# Patient Record
Sex: FEMALE | Race: WHITE | NOT HISPANIC OR LATINO | Employment: OTHER | URBAN - METROPOLITAN AREA
[De-identification: names, ages, dates, MRNs, and addresses within clinical notes are randomized per-mention and may not be internally consistent; named-entity substitution may affect disease eponyms.]

---

## 2017-03-06 ENCOUNTER — AMBULATORY - HEALTHEAST (OUTPATIENT)
Dept: CARDIOLOGY | Facility: CLINIC | Age: 82
End: 2017-03-06

## 2017-03-06 DIAGNOSIS — Z95.0 PACEMAKER: ICD-10-CM

## 2017-06-13 ENCOUNTER — AMBULATORY - HEALTHEAST (OUTPATIENT)
Dept: CARDIOLOGY | Facility: CLINIC | Age: 82
End: 2017-06-13

## 2017-06-13 DIAGNOSIS — Z95.0 CARDIAC PACEMAKER IN SITU: ICD-10-CM

## 2017-06-13 LAB — HCC DEVICE COMMENTS: NORMAL

## 2017-08-29 ENCOUNTER — AMBULATORY - HEALTHEAST (OUTPATIENT)
Dept: CARDIOLOGY | Facility: CLINIC | Age: 82
End: 2017-08-29

## 2017-08-29 DIAGNOSIS — I44.2 ATRIOVENTRICULAR BLOCK, COMPLETE (H): ICD-10-CM

## 2017-08-29 DIAGNOSIS — Z95.0 CARDIAC PACEMAKER IN SITU: ICD-10-CM

## 2017-08-29 LAB — HCC DEVICE COMMENTS: NORMAL

## 2017-08-29 RX ORDER — ALENDRONATE SODIUM 70 MG/1
70 TABLET ORAL WEEKLY
Status: SHIPPED | COMMUNITY
Start: 2016-11-23 | End: 2023-01-01

## 2017-08-29 ASSESSMENT — MIFFLIN-ST. JEOR: SCORE: 1037.35

## 2017-11-29 ENCOUNTER — AMBULATORY - HEALTHEAST (OUTPATIENT)
Dept: CARDIOLOGY | Facility: CLINIC | Age: 82
End: 2017-11-29

## 2017-11-29 DIAGNOSIS — Z95.0 CARDIAC PACEMAKER IN SITU: ICD-10-CM

## 2017-11-29 LAB — HCC DEVICE COMMENTS: NORMAL

## 2018-03-07 ENCOUNTER — AMBULATORY - HEALTHEAST (OUTPATIENT)
Dept: CARDIOLOGY | Facility: CLINIC | Age: 83
End: 2018-03-07

## 2018-03-07 DIAGNOSIS — Z95.0 CARDIAC PACEMAKER IN SITU: ICD-10-CM

## 2018-03-08 LAB — HCC DEVICE COMMENTS: NORMAL

## 2018-06-12 ENCOUNTER — AMBULATORY - HEALTHEAST (OUTPATIENT)
Dept: CARDIOLOGY | Facility: CLINIC | Age: 83
End: 2018-06-12

## 2018-06-12 DIAGNOSIS — Z95.0 CARDIAC PACEMAKER IN SITU: ICD-10-CM

## 2018-06-12 LAB
HCC DEVICE COMMENTS: NORMAL
HCC DEVICE IMPLANTING PROVIDER: NORMAL
HCC DEVICE MANUFACTURE: NORMAL
HCC DEVICE MODEL: NORMAL
HCC DEVICE SERIAL NUMBER: NORMAL
HCC DEVICE TYPE: NORMAL

## 2018-08-31 ENCOUNTER — AMBULATORY - HEALTHEAST (OUTPATIENT)
Dept: CARDIOLOGY | Facility: CLINIC | Age: 83
End: 2018-08-31

## 2018-08-31 DIAGNOSIS — Z95.0 CARDIAC PACEMAKER IN SITU: ICD-10-CM

## 2018-08-31 RX ORDER — MULTIPLE VITAMINS W/ MINERALS TAB 9MG-400MCG
1 TAB ORAL DAILY
Status: SHIPPED | COMMUNITY
Start: 2018-08-31 | End: 2023-01-01

## 2019-09-17 ENCOUNTER — AMBULATORY - HEALTHEAST (OUTPATIENT)
Dept: CARDIOLOGY | Facility: CLINIC | Age: 84
End: 2019-09-17

## 2019-09-17 DIAGNOSIS — Z95.0 CARDIAC PACEMAKER IN SITU: ICD-10-CM

## 2019-09-17 ASSESSMENT — MIFFLIN-ST. JEOR: SCORE: 1007.88

## 2020-09-16 ENCOUNTER — COMMUNICATION - HEALTHEAST (OUTPATIENT)
Dept: CARDIOLOGY | Facility: CLINIC | Age: 85
End: 2020-09-16

## 2020-09-17 ENCOUNTER — AMBULATORY - HEALTHEAST (OUTPATIENT)
Dept: CARDIOLOGY | Facility: CLINIC | Age: 85
End: 2020-09-17

## 2020-09-17 DIAGNOSIS — Z95.0 CARDIAC PACEMAKER IN SITU: ICD-10-CM

## 2020-09-17 DIAGNOSIS — I44.2 ATRIOVENTRICULAR BLOCK, COMPLETE (H): ICD-10-CM

## 2020-09-17 ASSESSMENT — MIFFLIN-ST. JEOR: SCORE: 989.73

## 2021-03-17 ENCOUNTER — COMMUNICATION - HEALTHEAST (OUTPATIENT)
Dept: CARDIOLOGY | Facility: CLINIC | Age: 86
End: 2021-03-17

## 2021-03-18 ENCOUNTER — AMBULATORY - HEALTHEAST (OUTPATIENT)
Dept: CARDIOLOGY | Facility: CLINIC | Age: 86
End: 2021-03-18

## 2021-03-18 DIAGNOSIS — Z95.0 CARDIAC PACEMAKER IN SITU: ICD-10-CM

## 2021-03-18 DIAGNOSIS — I44.2 ATRIOVENTRICULAR BLOCK, COMPLETE (H): ICD-10-CM

## 2021-03-18 ASSESSMENT — MIFFLIN-ST. JEOR: SCORE: 994.83

## 2021-05-25 ENCOUNTER — RECORDS - HEALTHEAST (OUTPATIENT)
Dept: ADMINISTRATIVE | Facility: CLINIC | Age: 86
End: 2021-05-25

## 2021-05-27 ENCOUNTER — RECORDS - HEALTHEAST (OUTPATIENT)
Dept: ADMINISTRATIVE | Facility: CLINIC | Age: 86
End: 2021-05-27

## 2021-05-31 VITALS — WEIGHT: 128 LBS | HEIGHT: 66 IN | BODY MASS INDEX: 20.57 KG/M2

## 2021-06-01 VITALS — BODY MASS INDEX: 20.18 KG/M2 | WEIGHT: 125 LBS

## 2021-06-03 VITALS
RESPIRATION RATE: 16 BRPM | DIASTOLIC BLOOD PRESSURE: 74 MMHG | HEART RATE: 60 BPM | BODY MASS INDEX: 20.83 KG/M2 | HEIGHT: 65 IN | WEIGHT: 125 LBS | SYSTOLIC BLOOD PRESSURE: 166 MMHG

## 2021-06-04 VITALS
DIASTOLIC BLOOD PRESSURE: 62 MMHG | RESPIRATION RATE: 14 BRPM | HEIGHT: 65 IN | HEART RATE: 77 BPM | BODY MASS INDEX: 20.16 KG/M2 | WEIGHT: 121 LBS | SYSTOLIC BLOOD PRESSURE: 148 MMHG

## 2021-06-05 VITALS
DIASTOLIC BLOOD PRESSURE: 72 MMHG | BODY MASS INDEX: 20.49 KG/M2 | WEIGHT: 123 LBS | SYSTOLIC BLOOD PRESSURE: 144 MMHG | HEIGHT: 65 IN | HEART RATE: 76 BPM | RESPIRATION RATE: 16 BRPM

## 2021-06-16 PROBLEM — Z95.0 CARDIAC PACEMAKER IN SITU: Status: ACTIVE | Noted: 2017-08-29

## 2021-06-16 NOTE — TELEPHONE ENCOUNTER

## 2021-09-21 ENCOUNTER — ANCILLARY PROCEDURE (OUTPATIENT)
Dept: CARDIOLOGY | Facility: CLINIC | Age: 86
End: 2021-09-21
Attending: INTERNAL MEDICINE

## 2021-09-21 VITALS
BODY MASS INDEX: 19.33 KG/M2 | SYSTOLIC BLOOD PRESSURE: 138 MMHG | HEIGHT: 65 IN | WEIGHT: 116 LBS | DIASTOLIC BLOOD PRESSURE: 58 MMHG | HEART RATE: 61 BPM | RESPIRATION RATE: 12 BRPM

## 2021-09-21 DIAGNOSIS — Z95.0 CARDIAC PACEMAKER IN SITU: ICD-10-CM

## 2021-09-21 LAB
MDC_IDC_LEAD_IMPLANT_DT: NORMAL
MDC_IDC_LEAD_IMPLANT_DT: NORMAL
MDC_IDC_LEAD_LOCATION: NORMAL
MDC_IDC_LEAD_LOCATION: NORMAL
MDC_IDC_LEAD_LOCATION_DETAIL_1: NORMAL
MDC_IDC_LEAD_LOCATION_DETAIL_1: NORMAL
MDC_IDC_LEAD_MFG: NORMAL
MDC_IDC_LEAD_MFG: NORMAL
MDC_IDC_LEAD_MODEL: NORMAL
MDC_IDC_LEAD_MODEL: NORMAL
MDC_IDC_LEAD_POLARITY_TYPE: NORMAL
MDC_IDC_LEAD_POLARITY_TYPE: NORMAL
MDC_IDC_LEAD_SERIAL: NORMAL
MDC_IDC_LEAD_SERIAL: NORMAL
MDC_IDC_MSMT_BATTERY_DTM: NORMAL
MDC_IDC_MSMT_BATTERY_IMPEDANCE: 672 OHM
MDC_IDC_MSMT_BATTERY_REMAINING_LONGEVITY: 85 MO
MDC_IDC_MSMT_BATTERY_STATUS: NORMAL
MDC_IDC_MSMT_BATTERY_VOLTAGE: 2.78 V
MDC_IDC_MSMT_LEADCHNL_RA_IMPEDANCE_VALUE: 504 OHM
MDC_IDC_MSMT_LEADCHNL_RA_PACING_THRESHOLD_AMPLITUDE: 0.5 V
MDC_IDC_MSMT_LEADCHNL_RA_PACING_THRESHOLD_AMPLITUDE: 0.5 V
MDC_IDC_MSMT_LEADCHNL_RA_PACING_THRESHOLD_PULSEWIDTH: 0.4 MS
MDC_IDC_MSMT_LEADCHNL_RA_PACING_THRESHOLD_PULSEWIDTH: 0.4 MS
MDC_IDC_MSMT_LEADCHNL_RA_SENSING_INTR_AMPL: 1 MV
MDC_IDC_MSMT_LEADCHNL_RV_IMPEDANCE_VALUE: 546 OHM
MDC_IDC_MSMT_LEADCHNL_RV_PACING_THRESHOLD_AMPLITUDE: 0.88 V
MDC_IDC_MSMT_LEADCHNL_RV_PACING_THRESHOLD_AMPLITUDE: 1 V
MDC_IDC_MSMT_LEADCHNL_RV_PACING_THRESHOLD_PULSEWIDTH: 0.4 MS
MDC_IDC_MSMT_LEADCHNL_RV_PACING_THRESHOLD_PULSEWIDTH: 0.4 MS
MDC_IDC_MSMT_LEADCHNL_RV_SENSING_INTR_AMPL: 11.2 MV
MDC_IDC_PG_IMPLANT_DTM: NORMAL
MDC_IDC_PG_MFG: NORMAL
MDC_IDC_PG_MODEL: NORMAL
MDC_IDC_PG_SERIAL: NORMAL
MDC_IDC_PG_TYPE: NORMAL
MDC_IDC_SESS_CLINIC_NAME: NORMAL
MDC_IDC_SESS_DTM: NORMAL
MDC_IDC_SESS_TYPE: NORMAL
MDC_IDC_SET_BRADY_AT_MODE_SWITCH_MODE: NORMAL
MDC_IDC_SET_BRADY_AT_MODE_SWITCH_RATE: 175 {BEATS}/MIN
MDC_IDC_SET_BRADY_LOWRATE: 60 {BEATS}/MIN
MDC_IDC_SET_BRADY_MAX_SENSOR_RATE: 130 {BEATS}/MIN
MDC_IDC_SET_BRADY_MAX_TRACKING_RATE: 130 {BEATS}/MIN
MDC_IDC_SET_BRADY_MODE: NORMAL
MDC_IDC_SET_BRADY_PAV_DELAY_LOW: 180 MS
MDC_IDC_SET_BRADY_SAV_DELAY_LOW: 150 MS
MDC_IDC_SET_LEADCHNL_RA_PACING_AMPLITUDE: NORMAL
MDC_IDC_SET_LEADCHNL_RA_PACING_CAPTURE_MODE: NORMAL
MDC_IDC_SET_LEADCHNL_RA_PACING_POLARITY: NORMAL
MDC_IDC_SET_LEADCHNL_RA_PACING_PULSEWIDTH: 0.4 MS
MDC_IDC_SET_LEADCHNL_RA_SENSING_POLARITY: NORMAL
MDC_IDC_SET_LEADCHNL_RA_SENSING_SENSITIVITY: 0.35 MV
MDC_IDC_SET_LEADCHNL_RV_PACING_AMPLITUDE: NORMAL
MDC_IDC_SET_LEADCHNL_RV_PACING_CAPTURE_MODE: NORMAL
MDC_IDC_SET_LEADCHNL_RV_PACING_POLARITY: NORMAL
MDC_IDC_SET_LEADCHNL_RV_PACING_PULSEWIDTH: 0.4 MS
MDC_IDC_SET_LEADCHNL_RV_SENSING_POLARITY: NORMAL
MDC_IDC_SET_LEADCHNL_RV_SENSING_SENSITIVITY: 2.8 MV
MDC_IDC_SET_ZONE_DETECTION_INTERVAL: 333.33 MS
MDC_IDC_SET_ZONE_DETECTION_INTERVAL: 342.86 MS
MDC_IDC_SET_ZONE_TYPE: NORMAL
MDC_IDC_SET_ZONE_TYPE: NORMAL
MDC_IDC_STAT_AT_BURDEN_PERCENT: 0.1 %
MDC_IDC_STAT_AT_DTM_END: NORMAL
MDC_IDC_STAT_AT_DTM_START: NORMAL
MDC_IDC_STAT_AT_MODE_SW_COUNT: 301
MDC_IDC_STAT_BRADY_AP_VP_PERCENT: 24 %
MDC_IDC_STAT_BRADY_AP_VS_PERCENT: 0 %
MDC_IDC_STAT_BRADY_AS_VP_PERCENT: 71 %
MDC_IDC_STAT_BRADY_AS_VS_PERCENT: 5 %
MDC_IDC_STAT_BRADY_DTM_END: NORMAL
MDC_IDC_STAT_BRADY_DTM_START: NORMAL
MDC_IDC_STAT_BRADY_RA_PERCENT_PACED: 24.7 %
MDC_IDC_STAT_BRADY_RV_PERCENT_PACED: 95.2 %
MDC_IDC_STAT_EPISODE_RECENT_COUNT: 0
MDC_IDC_STAT_EPISODE_RECENT_COUNT: 16
MDC_IDC_STAT_EPISODE_RECENT_COUNT_DTM_END: NORMAL
MDC_IDC_STAT_EPISODE_RECENT_COUNT_DTM_END: NORMAL
MDC_IDC_STAT_EPISODE_RECENT_COUNT_DTM_START: NORMAL
MDC_IDC_STAT_EPISODE_RECENT_COUNT_DTM_START: NORMAL
MDC_IDC_STAT_EPISODE_TYPE: NORMAL
MDC_IDC_STAT_EPISODE_TYPE: NORMAL

## 2021-09-21 ASSESSMENT — MIFFLIN-ST. JEOR: SCORE: 967.05

## 2021-10-28 ENCOUNTER — MEDICAL CORRESPONDENCE (OUTPATIENT)
Dept: HEALTH INFORMATION MANAGEMENT | Facility: CLINIC | Age: 86
End: 2021-10-28

## 2022-01-01 ENCOUNTER — LAB REQUISITION (OUTPATIENT)
Dept: LAB | Facility: CLINIC | Age: 87
End: 2022-01-01
Payer: COMMERCIAL

## 2022-01-01 DIAGNOSIS — Z95.0 CARDIAC PACEMAKER IN SITU: Primary | ICD-10-CM

## 2022-01-01 DIAGNOSIS — E78.2 MIXED HYPERLIPIDEMIA: ICD-10-CM

## 2022-01-01 DIAGNOSIS — I44.2 AV BLOCK, 3RD DEGREE (H): ICD-10-CM

## 2022-01-01 DIAGNOSIS — N18.4 CHRONIC KIDNEY DISEASE, STAGE 4 (SEVERE) (H): ICD-10-CM

## 2022-01-01 LAB
ANION GAP SERPL CALCULATED.3IONS-SCNC: 7 MMOL/L (ref 7–15)
BUN SERPL-MCNC: 31.6 MG/DL (ref 8–23)
CALCIUM SERPL-MCNC: 9.6 MG/DL (ref 8.8–10.2)
CHLORIDE SERPL-SCNC: 104 MMOL/L (ref 98–107)
CHOLEST SERPL-MCNC: 278 MG/DL
CREAT SERPL-MCNC: 1.49 MG/DL (ref 0.51–0.95)
DEPRECATED HCO3 PLAS-SCNC: 28 MMOL/L (ref 22–29)
GFR SERPL CREATININE-BSD FRML MDRD: 34 ML/MIN/1.73M2
GLUCOSE SERPL-MCNC: 81 MG/DL (ref 70–99)
HDLC SERPL-MCNC: 39 MG/DL
LDLC SERPL CALC-MCNC: 204 MG/DL
NONHDLC SERPL-MCNC: 239 MG/DL
POTASSIUM SERPL-SCNC: 4 MMOL/L (ref 3.4–5.3)
SODIUM SERPL-SCNC: 139 MMOL/L (ref 136–145)
TRIGL SERPL-MCNC: 175 MG/DL

## 2022-01-01 PROCEDURE — 80048 BASIC METABOLIC PNL TOTAL CA: CPT | Mod: ORL

## 2022-01-01 PROCEDURE — 36415 COLL VENOUS BLD VENIPUNCTURE: CPT | Mod: ORL

## 2022-01-01 PROCEDURE — P9604 ONE-WAY ALLOW PRORATED TRIP: HCPCS | Mod: ORL

## 2022-01-01 PROCEDURE — 80061 LIPID PANEL: CPT | Mod: ORL

## 2023-01-01 ENCOUNTER — APPOINTMENT (OUTPATIENT)
Dept: CT IMAGING | Facility: CLINIC | Age: 88
DRG: 871 | End: 2023-01-01
Attending: EMERGENCY MEDICINE
Payer: COMMERCIAL

## 2023-01-01 ENCOUNTER — APPOINTMENT (OUTPATIENT)
Dept: ULTRASOUND IMAGING | Facility: CLINIC | Age: 88
DRG: 871 | End: 2023-01-01
Attending: FAMILY MEDICINE
Payer: COMMERCIAL

## 2023-01-01 ENCOUNTER — DOCUMENTATION ONLY (OUTPATIENT)
Dept: CARDIOLOGY | Facility: CLINIC | Age: 88
End: 2023-01-01
Payer: COMMERCIAL

## 2023-01-01 ENCOUNTER — HOSPITAL ENCOUNTER (INPATIENT)
Facility: CLINIC | Age: 88
LOS: 2 days | DRG: 871 | End: 2023-07-09
Attending: EMERGENCY MEDICINE | Admitting: HOSPITALIST
Payer: COMMERCIAL

## 2023-01-01 ENCOUNTER — APPOINTMENT (OUTPATIENT)
Dept: CARDIOLOGY | Facility: CLINIC | Age: 88
DRG: 871 | End: 2023-01-01
Attending: HOSPITALIST
Payer: COMMERCIAL

## 2023-01-01 ENCOUNTER — ANCILLARY PROCEDURE (OUTPATIENT)
Dept: CARDIOLOGY | Facility: CLINIC | Age: 88
End: 2023-01-01
Attending: INTERNAL MEDICINE
Payer: COMMERCIAL

## 2023-01-01 ENCOUNTER — ANCILLARY PROCEDURE (OUTPATIENT)
Dept: ULTRASOUND IMAGING | Facility: CLINIC | Age: 88
DRG: 871 | End: 2023-01-01
Attending: EMERGENCY MEDICINE
Payer: COMMERCIAL

## 2023-01-01 ENCOUNTER — APPOINTMENT (OUTPATIENT)
Dept: OCCUPATIONAL THERAPY | Facility: CLINIC | Age: 88
DRG: 871 | End: 2023-01-01
Attending: HOSPITALIST
Payer: COMMERCIAL

## 2023-01-01 ENCOUNTER — APPOINTMENT (OUTPATIENT)
Dept: RADIOLOGY | Facility: CLINIC | Age: 88
DRG: 871 | End: 2023-01-01
Attending: EMERGENCY MEDICINE
Payer: COMMERCIAL

## 2023-01-01 ENCOUNTER — TELEPHONE (OUTPATIENT)
Dept: CARDIOLOGY | Facility: CLINIC | Age: 88
End: 2023-01-01
Payer: COMMERCIAL

## 2023-01-01 ENCOUNTER — APPOINTMENT (OUTPATIENT)
Dept: ULTRASOUND IMAGING | Facility: CLINIC | Age: 88
DRG: 871 | End: 2023-01-01
Attending: NURSE PRACTITIONER
Payer: COMMERCIAL

## 2023-01-01 VITALS
HEART RATE: 79 BPM | WEIGHT: 98.4 LBS | BODY MASS INDEX: 16.39 KG/M2 | RESPIRATION RATE: 30 BRPM | SYSTOLIC BLOOD PRESSURE: 118 MMHG | HEIGHT: 65 IN | OXYGEN SATURATION: 95 % | TEMPERATURE: 97.1 F | DIASTOLIC BLOOD PRESSURE: 86 MMHG

## 2023-01-01 DIAGNOSIS — I49.5 SICK SINUS SYNDROME (H): Primary | ICD-10-CM

## 2023-01-01 DIAGNOSIS — Z95.0 CARDIAC PACEMAKER IN SITU: ICD-10-CM

## 2023-01-01 DIAGNOSIS — N17.9 ACUTE RENAL FAILURE, UNSPECIFIED ACUTE RENAL FAILURE TYPE (H): ICD-10-CM

## 2023-01-01 DIAGNOSIS — I50.9 NEW ONSET OF CONGESTIVE HEART FAILURE (H): ICD-10-CM

## 2023-01-01 DIAGNOSIS — K72.00 SHOCK LIVER: ICD-10-CM

## 2023-01-01 DIAGNOSIS — I21.4 NSTEMI (NON-ST ELEVATED MYOCARDIAL INFARCTION) (H): ICD-10-CM

## 2023-01-01 DIAGNOSIS — I44.2 AV BLOCK, 3RD DEGREE (H): ICD-10-CM

## 2023-01-01 LAB
ALBUMIN MFR UR ELPH: 20.9 MG/DL (ref 1–14)
ALBUMIN SERPL-MCNC: 2.5 G/DL (ref 3.5–5)
ALBUMIN SERPL-MCNC: 2.7 G/DL (ref 3.5–5)
ALBUMIN SERPL-MCNC: 2.8 G/DL (ref 3.5–5)
ALBUMIN SERPL-MCNC: 3.2 G/DL (ref 3.5–5)
ALBUMIN UR-MCNC: 20 MG/DL
ALP SERPL-CCNC: 134 U/L (ref 45–120)
ALP SERPL-CCNC: 140 U/L (ref 45–120)
ALP SERPL-CCNC: 153 U/L (ref 45–120)
ALP SERPL-CCNC: 177 U/L (ref 45–120)
ALT SERPL W P-5'-P-CCNC: 1719 U/L (ref 0–45)
ALT SERPL W P-5'-P-CCNC: 1970 U/L (ref 0–45)
ALT SERPL W P-5'-P-CCNC: 2107 U/L (ref 0–45)
ALT SERPL W P-5'-P-CCNC: 2499 U/L (ref 0–45)
ANION GAP SERPL CALCULATED.3IONS-SCNC: 13 MMOL/L (ref 5–18)
ANION GAP SERPL CALCULATED.3IONS-SCNC: 14 MMOL/L (ref 5–18)
ANION GAP SERPL CALCULATED.3IONS-SCNC: 16 MMOL/L (ref 5–18)
ANION GAP SERPL CALCULATED.3IONS-SCNC: 28 MMOL/L (ref 5–18)
APAP SERPL-MCNC: <3 UG/ML (ref 10–20)
APPEARANCE UR: ABNORMAL
AST SERPL W P-5'-P-CCNC: 1476 U/L (ref 0–40)
AST SERPL W P-5'-P-CCNC: 1634 U/L (ref 0–40)
AST SERPL W P-5'-P-CCNC: 1774 U/L (ref 0–40)
AST SERPL W P-5'-P-CCNC: 2032 U/L (ref 0–40)
ATRIAL RATE - MUSE: 88 BPM
ATRIAL RATE - MUSE: 88 BPM
BACTERIA #/AREA URNS HPF: ABNORMAL /HPF
BACTERIA UR CULT: NO GROWTH
BASE EXCESS BLDV CALC-SCNC: -14.8 MMOL/L
BASE EXCESS BLDV CALC-SCNC: -15.3 MMOL/L
BASOPHILS # BLD AUTO: 0 10E3/UL (ref 0–0.2)
BASOPHILS NFR BLD AUTO: 0 %
BILIRUB SERPL-MCNC: 0.5 MG/DL (ref 0–1)
BILIRUB SERPL-MCNC: 0.6 MG/DL (ref 0–1)
BILIRUB SERPL-MCNC: 0.9 MG/DL (ref 0–1)
BILIRUB SERPL-MCNC: 1.5 MG/DL (ref 0–1)
BILIRUB UR QL STRIP: NEGATIVE
BNP SERPL-MCNC: 2552 PG/ML (ref 0–167)
BUN SERPL-MCNC: 100 MG/DL (ref 8–28)
BUN SERPL-MCNC: 100 MG/DL (ref 8–28)
BUN SERPL-MCNC: 103 MG/DL (ref 8–28)
BUN SERPL-MCNC: 98 MG/DL (ref 8–28)
CALCIUM SERPL-MCNC: 8.3 MG/DL (ref 8.5–10.5)
CALCIUM SERPL-MCNC: 8.7 MG/DL (ref 8.5–10.5)
CALCIUM SERPL-MCNC: 8.8 MG/DL (ref 8.5–10.5)
CALCIUM SERPL-MCNC: 9.8 MG/DL (ref 8.5–10.5)
CHLORIDE BLD-SCNC: 102 MMOL/L (ref 98–107)
CHLORIDE BLD-SCNC: 104 MMOL/L (ref 98–107)
CHLORIDE BLD-SCNC: 105 MMOL/L (ref 98–107)
CHLORIDE BLD-SCNC: 106 MMOL/L (ref 98–107)
CK SERPL-CCNC: 176 U/L (ref 30–190)
CK SERPL-CCNC: 241 U/L (ref 30–190)
CO2 SERPL-SCNC: 12 MMOL/L (ref 22–31)
CO2 SERPL-SCNC: 17 MMOL/L (ref 22–31)
CO2 SERPL-SCNC: 19 MMOL/L (ref 22–31)
CO2 SERPL-SCNC: 20 MMOL/L (ref 22–31)
COLOR UR AUTO: ABNORMAL
CREAT SERPL-MCNC: 2.64 MG/DL (ref 0.6–1.1)
CREAT SERPL-MCNC: 2.8 MG/DL (ref 0.6–1.1)
CREAT SERPL-MCNC: 2.96 MG/DL (ref 0.6–1.1)
CREAT SERPL-MCNC: 3.22 MG/DL (ref 0.6–1.1)
CREAT UR-MCNC: 47 MG/DL
DIASTOLIC BLOOD PRESSURE - MUSE: 68 MMHG
DIASTOLIC BLOOD PRESSURE - MUSE: 68 MMHG
EOSINOPHIL # BLD AUTO: 0 10E3/UL (ref 0–0.7)
EOSINOPHIL NFR BLD AUTO: 0 %
ERYTHROCYTE [DISTWIDTH] IN BLOOD BY AUTOMATED COUNT: 14.2 % (ref 10–15)
ERYTHROCYTE [DISTWIDTH] IN BLOOD BY AUTOMATED COUNT: 14.2 % (ref 10–15)
ERYTHROCYTE [DISTWIDTH] IN BLOOD BY AUTOMATED COUNT: 14.4 % (ref 10–15)
ERYTHROCYTE [DISTWIDTH] IN BLOOD BY AUTOMATED COUNT: 14.4 % (ref 10–15)
ERYTHROCYTE [DISTWIDTH] IN BLOOD BY AUTOMATED COUNT: 14.6 % (ref 10–15)
GFR SERPL CREATININE-BSD FRML MDRD: 13 ML/MIN/1.73M2
GFR SERPL CREATININE-BSD FRML MDRD: 15 ML/MIN/1.73M2
GFR SERPL CREATININE-BSD FRML MDRD: 16 ML/MIN/1.73M2
GFR SERPL CREATININE-BSD FRML MDRD: 17 ML/MIN/1.73M2
GLUCOSE BLD-MCNC: 108 MG/DL (ref 70–125)
GLUCOSE BLD-MCNC: 47 MG/DL (ref 70–125)
GLUCOSE BLD-MCNC: 84 MG/DL (ref 70–125)
GLUCOSE BLD-MCNC: 98 MG/DL (ref 70–125)
GLUCOSE BLDC GLUCOMTR-MCNC: 122 MG/DL (ref 70–99)
GLUCOSE BLDC GLUCOMTR-MCNC: 75 MG/DL (ref 70–99)
GLUCOSE BLDC GLUCOMTR-MCNC: 88 MG/DL (ref 70–99)
GLUCOSE BLDC GLUCOMTR-MCNC: 89 MG/DL (ref 70–99)
GLUCOSE BLDC GLUCOMTR-MCNC: 91 MG/DL (ref 70–99)
GLUCOSE UR STRIP-MCNC: NEGATIVE MG/DL
HCO3 BLDV-SCNC: 14 MMOL/L (ref 24–30)
HCO3 BLDV-SCNC: 14 MMOL/L (ref 24–30)
HCT VFR BLD AUTO: 26.9 % (ref 35–47)
HCT VFR BLD AUTO: 27.9 % (ref 35–47)
HCT VFR BLD AUTO: 28.5 % (ref 35–47)
HCT VFR BLD AUTO: 28.6 % (ref 35–47)
HCT VFR BLD AUTO: 29.8 % (ref 35–47)
HGB BLD-MCNC: 8.8 G/DL (ref 11.7–15.7)
HGB BLD-MCNC: 8.9 G/DL (ref 11.7–15.7)
HGB BLD-MCNC: 9.2 G/DL (ref 11.7–15.7)
HGB BLD-MCNC: 9.5 G/DL (ref 11.7–15.7)
HGB BLD-MCNC: 9.7 G/DL (ref 11.7–15.7)
HGB UR QL STRIP: ABNORMAL
HIV 1+2 AB+HIV1P24 AG SERPLBLD IA.RAPID: NON REACTIVE
HIV 1+2 AB+HIV1P24 AG SERPLBLD IA.RAPID: NON REACTIVE
HIV INTERPRETATION: NORMAL
HOLD SPECIMEN: NORMAL
HYALINE CASTS: 4 /LPF
IMM GRANULOCYTES # BLD: 0.1 10E3/UL
IMM GRANULOCYTES NFR BLD: 1 %
INR PPP: 1.4 (ref 0.85–1.15)
INR PPP: 1.5 (ref 0.85–1.15)
INTERPRETATION ECG - MUSE: NORMAL
INTERPRETATION ECG - MUSE: NORMAL
IRON BINDING CAPACITY (ROCHE): 215 UG/DL (ref 240–430)
IRON SATN MFR SERPL: 65 % (ref 15–46)
IRON SERPL-MCNC: 139 UG/DL (ref 37–145)
KETONES UR STRIP-MCNC: NEGATIVE MG/DL
LACTATE SERPL-SCNC: 1.9 MMOL/L (ref 0.7–2)
LACTATE SERPL-SCNC: 2 MMOL/L (ref 0.7–2)
LACTATE SERPL-SCNC: 2.4 MMOL/L (ref 0.7–2)
LACTATE SERPL-SCNC: 2.4 MMOL/L (ref 0.7–2)
LACTATE SERPL-SCNC: 4.4 MMOL/L (ref 0.7–2)
LACTATE SERPL-SCNC: 9.4 MMOL/L (ref 0.7–2)
LEUKOCYTE ESTERASE UR QL STRIP: ABNORMAL
LIPASE SERPL-CCNC: 34 U/L (ref 0–52)
LIPASE SERPL-CCNC: 42 U/L (ref 0–52)
LVEF ECHO: NORMAL
LYMPHOCYTES # BLD AUTO: 0.6 10E3/UL (ref 0.8–5.3)
LYMPHOCYTES # BLD AUTO: 0.8 10E3/UL (ref 0.8–5.3)
LYMPHOCYTES # BLD AUTO: 1.1 10E3/UL (ref 0.8–5.3)
LYMPHOCYTES NFR BLD AUTO: 10 %
LYMPHOCYTES NFR BLD AUTO: 11 %
LYMPHOCYTES NFR BLD AUTO: 6 %
MAGNESIUM SERPL-MCNC: 2.4 MG/DL (ref 1.8–2.6)
MCH RBC QN AUTO: 34 PG (ref 26.5–33)
MCH RBC QN AUTO: 34.1 PG (ref 26.5–33)
MCH RBC QN AUTO: 34.2 PG (ref 26.5–33)
MCH RBC QN AUTO: 34.6 PG (ref 26.5–33)
MCH RBC QN AUTO: 35.3 PG (ref 26.5–33)
MCHC RBC AUTO-ENTMCNC: 31.1 G/DL (ref 31.5–36.5)
MCHC RBC AUTO-ENTMCNC: 32.6 G/DL (ref 31.5–36.5)
MCHC RBC AUTO-ENTMCNC: 32.7 G/DL (ref 31.5–36.5)
MCHC RBC AUTO-ENTMCNC: 33 G/DL (ref 31.5–36.5)
MCHC RBC AUTO-ENTMCNC: 33.3 G/DL (ref 31.5–36.5)
MCV RBC AUTO: 104 FL (ref 78–100)
MCV RBC AUTO: 105 FL (ref 78–100)
MCV RBC AUTO: 105 FL (ref 78–100)
MCV RBC AUTO: 106 FL (ref 78–100)
MCV RBC AUTO: 110 FL (ref 78–100)
MDC_IDC_LEAD_IMPLANT_DT: NORMAL
MDC_IDC_LEAD_IMPLANT_DT: NORMAL
MDC_IDC_LEAD_LOCATION: NORMAL
MDC_IDC_LEAD_LOCATION: NORMAL
MDC_IDC_LEAD_LOCATION_DETAIL_1: NORMAL
MDC_IDC_LEAD_LOCATION_DETAIL_1: NORMAL
MDC_IDC_LEAD_MFG: NORMAL
MDC_IDC_LEAD_MFG: NORMAL
MDC_IDC_LEAD_MODEL: NORMAL
MDC_IDC_LEAD_MODEL: NORMAL
MDC_IDC_LEAD_POLARITY_TYPE: NORMAL
MDC_IDC_LEAD_POLARITY_TYPE: NORMAL
MDC_IDC_LEAD_SERIAL: NORMAL
MDC_IDC_LEAD_SERIAL: NORMAL
MDC_IDC_MSMT_BATTERY_DTM: NORMAL
MDC_IDC_MSMT_BATTERY_IMPEDANCE: 1166 OHM
MDC_IDC_MSMT_BATTERY_REMAINING_LONGEVITY: 62 MO
MDC_IDC_MSMT_BATTERY_STATUS: NORMAL
MDC_IDC_MSMT_BATTERY_VOLTAGE: 2.77 V
MDC_IDC_MSMT_LEADCHNL_RA_IMPEDANCE_VALUE: 519 OHM
MDC_IDC_MSMT_LEADCHNL_RA_PACING_THRESHOLD_AMPLITUDE: 0.75 V
MDC_IDC_MSMT_LEADCHNL_RA_PACING_THRESHOLD_PULSEWIDTH: 0.4 MS
MDC_IDC_MSMT_LEADCHNL_RA_SENSING_INTR_AMPL: 1 MV
MDC_IDC_MSMT_LEADCHNL_RV_IMPEDANCE_VALUE: 527 OHM
MDC_IDC_MSMT_LEADCHNL_RV_PACING_THRESHOLD_AMPLITUDE: 1 V
MDC_IDC_MSMT_LEADCHNL_RV_PACING_THRESHOLD_PULSEWIDTH: 0.4 MS
MDC_IDC_PG_IMPLANT_DTM: NORMAL
MDC_IDC_PG_MFG: NORMAL
MDC_IDC_PG_MODEL: NORMAL
MDC_IDC_PG_SERIAL: NORMAL
MDC_IDC_PG_TYPE: NORMAL
MDC_IDC_SESS_CLINIC_NAME: NORMAL
MDC_IDC_SESS_DTM: NORMAL
MDC_IDC_SESS_TYPE: NORMAL
MDC_IDC_SET_BRADY_AT_MODE_SWITCH_MODE: NORMAL
MDC_IDC_SET_BRADY_AT_MODE_SWITCH_RATE: 175 {BEATS}/MIN
MDC_IDC_SET_BRADY_LOWRATE: 60 {BEATS}/MIN
MDC_IDC_SET_BRADY_MAX_SENSOR_RATE: 130 {BEATS}/MIN
MDC_IDC_SET_BRADY_MAX_TRACKING_RATE: 130 {BEATS}/MIN
MDC_IDC_SET_BRADY_MODE: NORMAL
MDC_IDC_SET_BRADY_PAV_DELAY_LOW: 180 MS
MDC_IDC_SET_BRADY_SAV_DELAY_LOW: 150 MS
MDC_IDC_SET_LEADCHNL_RA_PACING_AMPLITUDE: NORMAL
MDC_IDC_SET_LEADCHNL_RA_PACING_CAPTURE_MODE: NORMAL
MDC_IDC_SET_LEADCHNL_RA_PACING_POLARITY: NORMAL
MDC_IDC_SET_LEADCHNL_RA_PACING_PULSEWIDTH: 0.4 MS
MDC_IDC_SET_LEADCHNL_RA_SENSING_POLARITY: NORMAL
MDC_IDC_SET_LEADCHNL_RA_SENSING_SENSITIVITY: 0.25 MV
MDC_IDC_SET_LEADCHNL_RV_PACING_AMPLITUDE: NORMAL
MDC_IDC_SET_LEADCHNL_RV_PACING_CAPTURE_MODE: NORMAL
MDC_IDC_SET_LEADCHNL_RV_PACING_POLARITY: NORMAL
MDC_IDC_SET_LEADCHNL_RV_PACING_PULSEWIDTH: 0.4 MS
MDC_IDC_SET_LEADCHNL_RV_SENSING_POLARITY: NORMAL
MDC_IDC_SET_LEADCHNL_RV_SENSING_SENSITIVITY: 2 MV
MDC_IDC_SET_ZONE_DETECTION_INTERVAL: 333.33 MS
MDC_IDC_SET_ZONE_DETECTION_INTERVAL: 342.86 MS
MDC_IDC_SET_ZONE_TYPE: NORMAL
MDC_IDC_SET_ZONE_TYPE: NORMAL
MDC_IDC_STAT_AT_BURDEN_PERCENT: 0 %
MDC_IDC_STAT_AT_DTM_END: NORMAL
MDC_IDC_STAT_AT_DTM_START: NORMAL
MDC_IDC_STAT_AT_MODE_SW_COUNT: 131
MDC_IDC_STAT_BRADY_AP_VP_PERCENT: 35 %
MDC_IDC_STAT_BRADY_AP_VS_PERCENT: 0 %
MDC_IDC_STAT_BRADY_AS_VP_PERCENT: 63 %
MDC_IDC_STAT_BRADY_AS_VS_PERCENT: 2 %
MDC_IDC_STAT_BRADY_DTM_END: NORMAL
MDC_IDC_STAT_BRADY_DTM_START: NORMAL
MDC_IDC_STAT_BRADY_RA_PERCENT_PACED: 35.4 %
MDC_IDC_STAT_BRADY_RV_PERCENT_PACED: 98.1 %
MDC_IDC_STAT_EPISODE_RECENT_COUNT: 0
MDC_IDC_STAT_EPISODE_RECENT_COUNT: 0
MDC_IDC_STAT_EPISODE_RECENT_COUNT: 3
MDC_IDC_STAT_EPISODE_RECENT_COUNT_DTM_END: NORMAL
MDC_IDC_STAT_EPISODE_RECENT_COUNT_DTM_START: NORMAL
MDC_IDC_STAT_EPISODE_TYPE: NORMAL
MONOCYTES # BLD AUTO: 0.5 10E3/UL (ref 0–1.3)
MONOCYTES # BLD AUTO: 0.7 10E3/UL (ref 0–1.3)
MONOCYTES # BLD AUTO: 0.9 10E3/UL (ref 0–1.3)
MONOCYTES NFR BLD AUTO: 6 %
MONOCYTES NFR BLD AUTO: 7 %
MONOCYTES NFR BLD AUTO: 9 %
MUCOUS THREADS #/AREA URNS LPF: PRESENT /LPF
NEUTROPHILS # BLD AUTO: 6.6 10E3/UL (ref 1.6–8.3)
NEUTROPHILS # BLD AUTO: 8 10E3/UL (ref 1.6–8.3)
NEUTROPHILS # BLD AUTO: 8.2 10E3/UL (ref 1.6–8.3)
NEUTROPHILS NFR BLD AUTO: 81 %
NEUTROPHILS NFR BLD AUTO: 83 %
NEUTROPHILS NFR BLD AUTO: 84 %
NITRATE UR QL: NEGATIVE
NRBC # BLD AUTO: 0 10E3/UL
NRBC # BLD AUTO: 0 10E3/UL
NRBC # BLD AUTO: 0.1 10E3/UL
NRBC BLD AUTO-RTO: 0 /100
NRBC BLD AUTO-RTO: 0 /100
NRBC BLD AUTO-RTO: 1 /100
OXYHGB MFR BLDV: 25.1 % (ref 70–75)
OXYHGB MFR BLDV: 46.9 % (ref 70–75)
P AXIS - MUSE: 76 DEGREES
P AXIS - MUSE: 88 DEGREES
PCO2 BLDV: 38 MM HG (ref 35–50)
PCO2 BLDV: 38 MM HG (ref 35–50)
PH BLDV: 7.16 [PH] (ref 7.35–7.45)
PH BLDV: 7.16 [PH] (ref 7.35–7.45)
PH UR STRIP: 5 [PH] (ref 5–7)
PLATELET # BLD AUTO: 120 10E3/UL (ref 150–450)
PLATELET # BLD AUTO: 140 10E3/UL (ref 150–450)
PLATELET # BLD AUTO: 143 10E3/UL (ref 150–450)
PLATELET # BLD AUTO: 152 10E3/UL (ref 150–450)
PLATELET # BLD AUTO: 166 10E3/UL (ref 150–450)
PO2 BLDV: 28 MM HG (ref 25–47)
PO2 BLDV: 39 MM HG (ref 25–47)
POTASSIUM BLD-SCNC: 4.6 MMOL/L (ref 3.5–5)
POTASSIUM BLD-SCNC: 5.1 MMOL/L (ref 3.5–5)
POTASSIUM BLD-SCNC: 5.2 MMOL/L (ref 3.5–5)
POTASSIUM BLD-SCNC: 5.3 MMOL/L (ref 3.5–5)
PR INTERVAL - MUSE: 196 MS
PR INTERVAL - MUSE: 200 MS
PROCALCITONIN SERPL-MCNC: 1.52 NG/ML (ref 0–0.49)
PROT SERPL-MCNC: 6.2 G/DL (ref 6–8)
PROT SERPL-MCNC: 6.6 G/DL (ref 6–8)
PROT SERPL-MCNC: 6.9 G/DL (ref 6–8)
PROT SERPL-MCNC: 7.6 G/DL (ref 6–8)
PROT/CREAT 24H UR: 0.44 MG/MG CR
QRS DURATION - MUSE: 152 MS
QRS DURATION - MUSE: 154 MS
QT - MUSE: 428 MS
QT - MUSE: 430 MS
QTC - MUSE: 517 MS
QTC - MUSE: 520 MS
R AXIS - MUSE: -74 DEGREES
R AXIS - MUSE: -74 DEGREES
RBC # BLD AUTO: 2.59 10E6/UL (ref 3.8–5.2)
RBC # BLD AUTO: 2.61 10E6/UL (ref 3.8–5.2)
RBC # BLD AUTO: 2.66 10E6/UL (ref 3.8–5.2)
RBC # BLD AUTO: 2.69 10E6/UL (ref 3.8–5.2)
RBC # BLD AUTO: 2.84 10E6/UL (ref 3.8–5.2)
RBC URINE: 2 /HPF
SAO2 % BLDV: 25.5 % (ref 70–75)
SAO2 % BLDV: 47.9 % (ref 70–75)
SODIUM SERPL-SCNC: 135 MMOL/L (ref 136–145)
SODIUM SERPL-SCNC: 137 MMOL/L (ref 136–145)
SODIUM SERPL-SCNC: 140 MMOL/L (ref 136–145)
SODIUM SERPL-SCNC: 144 MMOL/L (ref 136–145)
SP GR UR STRIP: 1.01 (ref 1–1.03)
SQUAMOUS EPITHELIAL: 2 /HPF
SYSTOLIC BLOOD PRESSURE - MUSE: 109 MMHG
SYSTOLIC BLOOD PRESSURE - MUSE: 109 MMHG
T AXIS - MUSE: 100 DEGREES
T AXIS - MUSE: 99 DEGREES
T4 FREE SERPL-MCNC: 1.16 NG/DL (ref 0.9–1.7)
TROPONIN I SERPL-MCNC: 12.68 NG/ML (ref 0–0.29)
TROPONIN I SERPL-MCNC: 14.98 NG/ML (ref 0–0.29)
TROPONIN I SERPL-MCNC: 17.9 NG/ML (ref 0–0.29)
TROPONIN I SERPL-MCNC: 19.45 NG/ML (ref 0–0.29)
TSH SERPL DL<=0.005 MIU/L-ACNC: 17.73 UIU/ML (ref 0.3–5)
UFH PPP CHRO-ACNC: 0.05 IU/ML
UFH PPP CHRO-ACNC: 0.12 IU/ML
UROBILINOGEN UR STRIP-MCNC: <2 MG/DL
VENTRICULAR RATE- MUSE: 88 BPM
VENTRICULAR RATE- MUSE: 88 BPM
WBC # BLD AUTO: 10.5 10E3/UL (ref 4–11)
WBC # BLD AUTO: 7.9 10E3/UL (ref 4–11)
WBC # BLD AUTO: 8 10E3/UL (ref 4–11)
WBC # BLD AUTO: 9.8 10E3/UL (ref 4–11)
WBC # BLD AUTO: 9.9 10E3/UL (ref 4–11)
WBC URINE: 18 /HPF

## 2023-01-01 PROCEDURE — 83521 IG LIGHT CHAINS FREE EACH: CPT | Performed by: NURSE PRACTITIONER

## 2023-01-01 PROCEDURE — 76770 US EXAM ABDO BACK WALL COMP: CPT

## 2023-01-01 PROCEDURE — 83605 ASSAY OF LACTIC ACID: CPT | Performed by: EMERGENCY MEDICINE

## 2023-01-01 PROCEDURE — 87040 BLOOD CULTURE FOR BACTERIA: CPT | Performed by: EMERGENCY MEDICINE

## 2023-01-01 PROCEDURE — 83690 ASSAY OF LIPASE: CPT | Performed by: EMERGENCY MEDICINE

## 2023-01-01 PROCEDURE — 84484 ASSAY OF TROPONIN QUANT: CPT | Performed by: HOSPITALIST

## 2023-01-01 PROCEDURE — 85027 COMPLETE CBC AUTOMATED: CPT | Performed by: FAMILY MEDICINE

## 2023-01-01 PROCEDURE — 80053 COMPREHEN METABOLIC PANEL: CPT | Performed by: FAMILY MEDICINE

## 2023-01-01 PROCEDURE — 84145 PROCALCITONIN (PCT): CPT | Performed by: EMERGENCY MEDICINE

## 2023-01-01 PROCEDURE — 36415 COLL VENOUS BLD VENIPUNCTURE: CPT | Performed by: HOSPITALIST

## 2023-01-01 PROCEDURE — 85027 COMPLETE CBC AUTOMATED: CPT | Performed by: EMERGENCY MEDICINE

## 2023-01-01 PROCEDURE — 258N000003 HC RX IP 258 OP 636: Performed by: HOSPITALIST

## 2023-01-01 PROCEDURE — 99233 SBSQ HOSP IP/OBS HIGH 50: CPT | Performed by: FAMILY MEDICINE

## 2023-01-01 PROCEDURE — 200N000001 HC R&B ICU

## 2023-01-01 PROCEDURE — 74176 CT ABD & PELVIS W/O CONTRAST: CPT

## 2023-01-01 PROCEDURE — 84156 ASSAY OF PROTEIN URINE: CPT | Performed by: NURSE PRACTITIONER

## 2023-01-01 PROCEDURE — 99223 1ST HOSP IP/OBS HIGH 75: CPT | Performed by: HOSPITALIST

## 2023-01-01 PROCEDURE — 250N000011 HC RX IP 250 OP 636: Mod: JZ | Performed by: FAMILY MEDICINE

## 2023-01-01 PROCEDURE — 84439 ASSAY OF FREE THYROXINE: CPT | Performed by: EMERGENCY MEDICINE

## 2023-01-01 PROCEDURE — 83690 ASSAY OF LIPASE: CPT | Performed by: HOSPITALIST

## 2023-01-01 PROCEDURE — 86335 IMMUNFIX E-PHORSIS/URINE/CSF: CPT | Performed by: PATHOLOGY

## 2023-01-01 PROCEDURE — 93005 ELECTROCARDIOGRAM TRACING: CPT | Performed by: EMERGENCY MEDICINE

## 2023-01-01 PROCEDURE — 86334 IMMUNOFIX E-PHORESIS SERUM: CPT | Mod: 26

## 2023-01-01 PROCEDURE — 85520 HEPARIN ASSAY: CPT | Performed by: EMERGENCY MEDICINE

## 2023-01-01 PROCEDURE — 83605 ASSAY OF LACTIC ACID: CPT | Performed by: HOSPITALIST

## 2023-01-01 PROCEDURE — 83735 ASSAY OF MAGNESIUM: CPT | Performed by: EMERGENCY MEDICINE

## 2023-01-01 PROCEDURE — 250N000013 HC RX MED GY IP 250 OP 250 PS 637: Performed by: HOSPITALIST

## 2023-01-01 PROCEDURE — 36415 COLL VENOUS BLD VENIPUNCTURE: CPT | Performed by: EMERGENCY MEDICINE

## 2023-01-01 PROCEDURE — 999N000104 HEPATITIS C RNA, QUANTITATIVE BY PCR: Performed by: FAMILY MEDICINE

## 2023-01-01 PROCEDURE — 250N000011 HC RX IP 250 OP 636: Performed by: INTERNAL MEDICINE

## 2023-01-01 PROCEDURE — 250N000009 HC RX 250: Performed by: INTERNAL MEDICINE

## 2023-01-01 PROCEDURE — 250N000011 HC RX IP 250 OP 636: Performed by: HOSPITALIST

## 2023-01-01 PROCEDURE — 70450 CT HEAD/BRAIN W/O DYE: CPT

## 2023-01-01 PROCEDURE — 999N000104 HEPATITIS B SURFACE ANTIGEN: Performed by: FAMILY MEDICINE

## 2023-01-01 PROCEDURE — 99222 1ST HOSP IP/OBS MODERATE 55: CPT | Performed by: INTERNAL MEDICINE

## 2023-01-01 PROCEDURE — 250N000011 HC RX IP 250 OP 636: Mod: JZ | Performed by: EMERGENCY MEDICINE

## 2023-01-01 PROCEDURE — 97166 OT EVAL MOD COMPLEX 45 MIN: CPT | Mod: GO

## 2023-01-01 PROCEDURE — 80143 DRUG ASSAY ACETAMINOPHEN: CPT | Performed by: EMERGENCY MEDICINE

## 2023-01-01 PROCEDURE — 999N000208 ECHOCARDIOGRAM COMPLETE

## 2023-01-01 PROCEDURE — 84155 ASSAY OF PROTEIN SERUM: CPT | Performed by: HOSPITALIST

## 2023-01-01 PROCEDURE — 85610 PROTHROMBIN TIME: CPT | Performed by: EMERGENCY MEDICINE

## 2023-01-01 PROCEDURE — 84484 ASSAY OF TROPONIN QUANT: CPT | Performed by: EMERGENCY MEDICINE

## 2023-01-01 PROCEDURE — 85025 COMPLETE CBC W/AUTO DIFF WBC: CPT | Performed by: HOSPITALIST

## 2023-01-01 PROCEDURE — 255N000002 HC RX 255 OP 636: Performed by: FAMILY MEDICINE

## 2023-01-01 PROCEDURE — 71046 X-RAY EXAM CHEST 2 VIEWS: CPT

## 2023-01-01 PROCEDURE — 85025 COMPLETE CBC W/AUTO DIFF WBC: CPT | Performed by: EMERGENCY MEDICINE

## 2023-01-01 PROCEDURE — 83880 ASSAY OF NATRIURETIC PEPTIDE: CPT | Performed by: EMERGENCY MEDICINE

## 2023-01-01 PROCEDURE — 93280 PM DEVICE PROGR EVAL DUAL: CPT | Performed by: INTERNAL MEDICINE

## 2023-01-01 PROCEDURE — 999N000157 HC STATISTIC RCP TIME EA 10 MIN

## 2023-01-01 PROCEDURE — 86709 HEPATITIS A IGM ANTIBODY: CPT | Performed by: NURSE PRACTITIONER

## 2023-01-01 PROCEDURE — 86335 IMMUNFIX E-PHORSIS/URINE/CSF: CPT | Mod: 26

## 2023-01-01 PROCEDURE — 82550 ASSAY OF CK (CPK): CPT | Performed by: HOSPITALIST

## 2023-01-01 PROCEDURE — 93970 EXTREMITY STUDY: CPT

## 2023-01-01 PROCEDURE — 83550 IRON BINDING TEST: CPT | Performed by: FAMILY MEDICINE

## 2023-01-01 PROCEDURE — 82962 GLUCOSE BLOOD TEST: CPT

## 2023-01-01 PROCEDURE — 86334 IMMUNOFIX E-PHORESIS SERUM: CPT | Performed by: PATHOLOGY

## 2023-01-01 PROCEDURE — 250N000009 HC RX 250: Performed by: FAMILY MEDICINE

## 2023-01-01 PROCEDURE — 82805 BLOOD GASES W/O2 SATURATION: CPT | Performed by: HOSPITALIST

## 2023-01-01 PROCEDURE — 250N000013 HC RX MED GY IP 250 OP 250 PS 637: Performed by: FAMILY MEDICINE

## 2023-01-01 PROCEDURE — 93306 TTE W/DOPPLER COMPLETE: CPT | Mod: 26 | Performed by: INTERNAL MEDICINE

## 2023-01-01 PROCEDURE — 82805 BLOOD GASES W/O2 SATURATION: CPT | Performed by: FAMILY MEDICINE

## 2023-01-01 PROCEDURE — 76705 ECHO EXAM OF ABDOMEN: CPT

## 2023-01-01 PROCEDURE — 81001 URINALYSIS AUTO W/SCOPE: CPT | Performed by: EMERGENCY MEDICINE

## 2023-01-01 PROCEDURE — 85520 HEPARIN ASSAY: CPT | Performed by: HOSPITALIST

## 2023-01-01 PROCEDURE — 84443 ASSAY THYROID STIM HORMONE: CPT | Performed by: EMERGENCY MEDICINE

## 2023-01-01 PROCEDURE — 80053 COMPREHEN METABOLIC PANEL: CPT | Performed by: EMERGENCY MEDICINE

## 2023-01-01 PROCEDURE — 87086 URINE CULTURE/COLONY COUNT: CPT | Performed by: EMERGENCY MEDICINE

## 2023-01-01 PROCEDURE — 250N000011 HC RX IP 250 OP 636: Performed by: EMERGENCY MEDICINE

## 2023-01-01 PROCEDURE — 82550 ASSAY OF CK (CPK): CPT | Performed by: NURSE PRACTITIONER

## 2023-01-01 PROCEDURE — 85610 PROTHROMBIN TIME: CPT | Performed by: HOSPITALIST

## 2023-01-01 PROCEDURE — 97535 SELF CARE MNGMENT TRAINING: CPT | Mod: GO

## 2023-01-01 RX ORDER — FUROSEMIDE 10 MG/ML
40 INJECTION INTRAMUSCULAR; INTRAVENOUS EVERY 12 HOURS
Status: DISCONTINUED | OUTPATIENT
Start: 2023-01-01 | End: 2023-01-01 | Stop reason: HOSPADM

## 2023-01-01 RX ORDER — ASPIRIN 81 MG/1
81 TABLET ORAL DAILY
Status: DISCONTINUED | OUTPATIENT
Start: 2023-01-01 | End: 2023-01-01 | Stop reason: HOSPADM

## 2023-01-01 RX ORDER — LANOLIN ALCOHOL/MO/W.PET/CERES
3 CREAM (GRAM) TOPICAL
Status: DISCONTINUED | OUTPATIENT
Start: 2023-01-01 | End: 2023-01-01 | Stop reason: HOSPADM

## 2023-01-01 RX ORDER — ONDANSETRON 4 MG/1
4 TABLET, ORALLY DISINTEGRATING ORAL EVERY 6 HOURS PRN
Status: DISCONTINUED | OUTPATIENT
Start: 2023-01-01 | End: 2023-01-01 | Stop reason: HOSPADM

## 2023-01-01 RX ORDER — HYDROMORPHONE HYDROCHLORIDE 1 MG/ML
0.5 INJECTION, SOLUTION INTRAMUSCULAR; INTRAVENOUS; SUBCUTANEOUS
Status: DISCONTINUED | OUTPATIENT
Start: 2023-01-01 | End: 2023-01-01 | Stop reason: HOSPADM

## 2023-01-01 RX ORDER — MORPHINE SULFATE 4 MG/ML
4 INJECTION, SOLUTION INTRAMUSCULAR; INTRAVENOUS ONCE
Status: COMPLETED | OUTPATIENT
Start: 2023-01-01 | End: 2023-01-01

## 2023-01-01 RX ORDER — VANCOMYCIN HYDROCHLORIDE 1 G/200ML
1000 INJECTION, SOLUTION INTRAVENOUS ONCE
Status: COMPLETED | OUTPATIENT
Start: 2023-01-01 | End: 2023-01-01

## 2023-01-01 RX ORDER — DOCUSATE SODIUM 100 MG/1
100 CAPSULE, LIQUID FILLED ORAL 2 TIMES DAILY PRN
Status: DISCONTINUED | OUTPATIENT
Start: 2023-01-01 | End: 2023-01-01 | Stop reason: HOSPADM

## 2023-01-01 RX ORDER — SODIUM CHLORIDE 9 MG/ML
INJECTION, SOLUTION INTRAVENOUS CONTINUOUS
Status: DISCONTINUED | OUTPATIENT
Start: 2023-01-01 | End: 2023-01-01

## 2023-01-01 RX ORDER — LIDOCAINE 40 MG/G
CREAM TOPICAL
Status: DISCONTINUED | OUTPATIENT
Start: 2023-01-01 | End: 2023-01-01 | Stop reason: HOSPADM

## 2023-01-01 RX ORDER — MEROPENEM 500 MG/1
500 INJECTION, POWDER, FOR SOLUTION INTRAVENOUS EVERY 24 HOURS
Status: DISCONTINUED | OUTPATIENT
Start: 2023-01-01 | End: 2023-01-01 | Stop reason: HOSPADM

## 2023-01-01 RX ORDER — NALOXONE HYDROCHLORIDE 0.4 MG/ML
0.4 INJECTION, SOLUTION INTRAMUSCULAR; INTRAVENOUS; SUBCUTANEOUS
Status: DISCONTINUED | OUTPATIENT
Start: 2023-01-01 | End: 2023-01-01 | Stop reason: HOSPADM

## 2023-01-01 RX ORDER — FUROSEMIDE 10 MG/ML
20 INJECTION INTRAMUSCULAR; INTRAVENOUS EVERY 12 HOURS
Status: DISCONTINUED | OUTPATIENT
Start: 2023-01-01 | End: 2023-01-01

## 2023-01-01 RX ORDER — AMLODIPINE BESYLATE 10 MG/1
10 TABLET ORAL DAILY
COMMUNITY
Start: 2022-02-24

## 2023-01-01 RX ORDER — SODIUM BICARBONATE 650 MG/1
1300 TABLET ORAL 2 TIMES DAILY
Status: DISCONTINUED | OUTPATIENT
Start: 2023-01-01 | End: 2023-01-01 | Stop reason: HOSPADM

## 2023-01-01 RX ORDER — PROCHLORPERAZINE 25 MG
12.5 SUPPOSITORY, RECTAL RECTAL EVERY 12 HOURS PRN
Status: DISCONTINUED | OUTPATIENT
Start: 2023-01-01 | End: 2023-01-01 | Stop reason: HOSPADM

## 2023-01-01 RX ORDER — NALOXONE HYDROCHLORIDE 0.4 MG/ML
0.2 INJECTION, SOLUTION INTRAMUSCULAR; INTRAVENOUS; SUBCUTANEOUS
Status: DISCONTINUED | OUTPATIENT
Start: 2023-01-01 | End: 2023-01-01 | Stop reason: HOSPADM

## 2023-01-01 RX ORDER — GLUCOSAMINE/CHONDR SU A SOD 750-600 MG
1500 TABLET ORAL DAILY
COMMUNITY
Start: 2022-02-24

## 2023-01-01 RX ORDER — HEPARIN SODIUM 10000 [USP'U]/100ML
0-5000 INJECTION, SOLUTION INTRAVENOUS CONTINUOUS
Status: DISCONTINUED | OUTPATIENT
Start: 2023-01-01 | End: 2023-01-01

## 2023-01-01 RX ORDER — FUROSEMIDE 10 MG/ML
40 INJECTION INTRAMUSCULAR; INTRAVENOUS ONCE
Status: COMPLETED | OUTPATIENT
Start: 2023-01-01 | End: 2023-01-01

## 2023-01-01 RX ORDER — CEFEPIME HYDROCHLORIDE 1 G/1
1 INJECTION, POWDER, FOR SOLUTION INTRAMUSCULAR; INTRAVENOUS ONCE
Status: COMPLETED | OUTPATIENT
Start: 2023-01-01 | End: 2023-01-01

## 2023-01-01 RX ORDER — ATORVASTATIN CALCIUM 40 MG/1
40 TABLET, FILM COATED ORAL AT BEDTIME
Status: DISCONTINUED | OUTPATIENT
Start: 2023-01-01 | End: 2023-01-01

## 2023-01-01 RX ORDER — PROCHLORPERAZINE MALEATE 5 MG
5 TABLET ORAL EVERY 6 HOURS PRN
Status: DISCONTINUED | OUTPATIENT
Start: 2023-01-01 | End: 2023-01-01 | Stop reason: HOSPADM

## 2023-01-01 RX ORDER — MAGNESIUM HYDROXIDE/ALUMINUM HYDROXICE/SIMETHICONE 120; 1200; 1200 MG/30ML; MG/30ML; MG/30ML
30 SUSPENSION ORAL EVERY 4 HOURS PRN
Status: DISCONTINUED | OUTPATIENT
Start: 2023-01-01 | End: 2023-01-01 | Stop reason: HOSPADM

## 2023-01-01 RX ORDER — CEFEPIME HYDROCHLORIDE 1 G/1
1 INJECTION, POWDER, FOR SOLUTION INTRAMUSCULAR; INTRAVENOUS EVERY 24 HOURS
Status: DISCONTINUED | OUTPATIENT
Start: 2023-01-01 | End: 2023-01-01 | Stop reason: HOSPADM

## 2023-01-01 RX ORDER — ONDANSETRON 2 MG/ML
4 INJECTION INTRAMUSCULAR; INTRAVENOUS EVERY 6 HOURS PRN
Status: DISCONTINUED | OUTPATIENT
Start: 2023-01-01 | End: 2023-01-01 | Stop reason: HOSPADM

## 2023-01-01 RX ORDER — DOBUTAMINE HYDROCHLORIDE 200 MG/100ML
5 INJECTION INTRAVENOUS CONTINUOUS
Status: DISCONTINUED | OUTPATIENT
Start: 2023-01-01 | End: 2023-01-01 | Stop reason: HOSPADM

## 2023-01-01 RX ADMIN — SODIUM BICARBONATE 650 MG TABLET 1300 MG: at 12:53

## 2023-01-01 RX ADMIN — CEFEPIME 1 G: 1 INJECTION, POWDER, FOR SOLUTION INTRAMUSCULAR; INTRAVENOUS at 19:56

## 2023-01-01 RX ADMIN — MEROPENEM 500 MG: 500 INJECTION, POWDER, FOR SOLUTION INTRAVENOUS at 02:20

## 2023-01-01 RX ADMIN — SODIUM CHLORIDE: 9 INJECTION, SOLUTION INTRAVENOUS at 02:57

## 2023-01-01 RX ADMIN — VANCOMYCIN HYDROCHLORIDE 750 MG: 5 INJECTION, POWDER, LYOPHILIZED, FOR SOLUTION INTRAVENOUS at 03:07

## 2023-01-01 RX ADMIN — MORPHINE SULFATE 4 MG: 4 INJECTION, SOLUTION INTRAMUSCULAR; INTRAVENOUS at 21:33

## 2023-01-01 RX ADMIN — HEPARIN SODIUM 830 UNITS/HR: 10000 INJECTION, SOLUTION INTRAVENOUS at 02:58

## 2023-01-01 RX ADMIN — FUROSEMIDE 40 MG: 10 INJECTION, SOLUTION INTRAMUSCULAR; INTRAVENOUS at 20:59

## 2023-01-01 RX ADMIN — SODIUM CHLORIDE: 9 INJECTION, SOLUTION INTRAVENOUS at 22:48

## 2023-01-01 RX ADMIN — SODIUM BICARBONATE 50 MEQ: 84 INJECTION, SOLUTION INTRAVENOUS at 02:35

## 2023-01-01 RX ADMIN — SODIUM BICARBONATE 650 MG TABLET 1300 MG: at 21:21

## 2023-01-01 RX ADMIN — PERFLUTREN 3 ML: 6.52 INJECTION, SUSPENSION INTRAVENOUS at 08:58

## 2023-01-01 RX ADMIN — DEXTROSE AND SODIUM CHLORIDE: 5; 900 INJECTION, SOLUTION INTRAVENOUS at 00:40

## 2023-01-01 RX ADMIN — FUROSEMIDE 40 MG: 10 INJECTION, SOLUTION INTRAVENOUS at 17:35

## 2023-01-01 RX ADMIN — FUROSEMIDE 20 MG: 10 INJECTION, SOLUTION INTRAMUSCULAR; INTRAVENOUS at 07:00

## 2023-01-01 RX ADMIN — HYDROMORPHONE HYDROCHLORIDE 0.5 MG: 1 INJECTION, SOLUTION INTRAMUSCULAR; INTRAVENOUS; SUBCUTANEOUS at 21:28

## 2023-01-01 RX ADMIN — SODIUM CHLORIDE 250 ML: 9 INJECTION, SOLUTION INTRAVENOUS at 01:55

## 2023-01-01 RX ADMIN — CEFEPIME 1 G: 1 INJECTION, POWDER, FOR SOLUTION INTRAMUSCULAR; INTRAVENOUS at 20:19

## 2023-01-01 RX ADMIN — HYDROMORPHONE HYDROCHLORIDE 2 MG: 2 TABLET ORAL at 12:51

## 2023-01-01 RX ADMIN — HEPARIN SODIUM 550 UNITS/HR: 10000 INJECTION, SOLUTION INTRAVENOUS at 21:04

## 2023-01-01 RX ADMIN — ONDANSETRON 4 MG: 2 INJECTION INTRAMUSCULAR; INTRAVENOUS at 20:28

## 2023-01-01 RX ADMIN — LIDOCAINE HYDROCHLORIDE 4 ML: 10 INJECTION, SOLUTION EPIDURAL; INFILTRATION; INTRACAUDAL; PERINEURAL at 10:45

## 2023-01-01 RX ADMIN — HYDROMORPHONE HYDROCHLORIDE 1 MG: 2 TABLET ORAL at 20:19

## 2023-01-01 RX ADMIN — SODIUM CHLORIDE 250 ML: 9 INJECTION, SOLUTION INTRAVENOUS at 21:37

## 2023-01-01 RX ADMIN — DOBUTAMINE HYDROCHLORIDE 5 MCG/KG/MIN: 200 INJECTION INTRAVENOUS at 02:30

## 2023-01-01 RX ADMIN — VANCOMYCIN HYDROCHLORIDE 1000 MG: 1 INJECTION, SOLUTION INTRAVENOUS at 21:36

## 2023-01-01 RX ADMIN — ASPIRIN 81 MG: 81 TABLET, COATED ORAL at 12:53

## 2023-01-01 RX ADMIN — PROCHLORPERAZINE EDISYLATE 5 MG: 5 INJECTION INTRAMUSCULAR; INTRAVENOUS at 21:20

## 2023-01-01 RX ADMIN — ONDANSETRON 4 MG: 2 INJECTION INTRAMUSCULAR; INTRAVENOUS at 03:25

## 2023-01-01 ASSESSMENT — ACTIVITIES OF DAILY LIVING (ADL)
TOILETING_ISSUES: NO
ADLS_ACUITY_SCORE: 24
CONCENTRATING,_REMEMBERING_OR_MAKING_DECISIONS_DIFFICULTY: NO
ADLS_ACUITY_SCORE: 24
NUMBER_OF_TIMES_PATIENT_HAS_FALLEN_WITHIN_LAST_SIX_MONTHS: 1
ADLS_ACUITY_SCORE: 24
ADLS_ACUITY_SCORE: 24
WALKING_OR_CLIMBING_STAIRS_DIFFICULTY: NO
WEAR_GLASSES_OR_BLIND: YES
FALL_HISTORY_WITHIN_LAST_SIX_MONTHS: YES
CHANGE_IN_FUNCTIONAL_STATUS_SINCE_ONSET_OF_CURRENT_ILLNESS/INJURY: YES
ADLS_ACUITY_SCORE: 35
ADLS_ACUITY_SCORE: 20
ADLS_ACUITY_SCORE: 24
ADLS_ACUITY_SCORE: 24
VISION_MANAGEMENT: GLASSES
ADLS_ACUITY_SCORE: 22
ADLS_ACUITY_SCORE: 20
DRESSING/BATHING_DIFFICULTY: NO
ADLS_ACUITY_SCORE: 24
DIFFICULTY_EATING/SWALLOWING: NO
ADLS_ACUITY_SCORE: 35
ADLS_ACUITY_SCORE: 24
DOING_ERRANDS_INDEPENDENTLY_DIFFICULTY: NO
ADLS_ACUITY_SCORE: 35

## 2023-01-01 ASSESSMENT — ENCOUNTER SYMPTOMS
GASTROINTESTINAL NEGATIVE: 1
NEUROLOGICAL NEGATIVE: 1
CARDIOVASCULAR NEGATIVE: 1
CONSTITUTIONAL NEGATIVE: 1
RESPIRATORY NEGATIVE: 1

## 2023-01-23 NOTE — PROGRESS NOTES
Patient has not scheduled a clinic appointment with [facility] since 9/21/2021. Multiple attempts to reach patient's son Ruslan have been unsuccessful. Voicemail is full so unable to leave message as well. Sent letter to schedule office visit or to update our clinic if patient is being seen elsewhere. Delinquent letter was sent per protocol asking the patient to update our clinic or will be made inactive 30 days after the letter has been sent.       Claudine Adrian  Device Tech  [phone]

## 2023-06-22 NOTE — TELEPHONE ENCOUNTER
Phone call to patient and son Ruslan regarding missed remote checks from home. No answer on both phone and unable to leave a message due to voicemail not setup for patient and voicemail full on sons. At last office visit in our [facility] on 2/13/2023, patient/son Ruslan agreed to do remote checks now. Previously they were coming in Q6 in the clinic. Remotes are manually done but our clinic has not received any. Sending another letter requesting patient or son to call our Tech Line.       Claudine Adrian  Device Tech  [phone]

## 2023-07-07 PROBLEM — N17.9 ACUTE RENAL FAILURE, UNSPECIFIED ACUTE RENAL FAILURE TYPE (H): Status: ACTIVE | Noted: 2023-01-01

## 2023-07-07 PROBLEM — I21.4 NSTEMI (NON-ST ELEVATED MYOCARDIAL INFARCTION) (H): Status: ACTIVE | Noted: 2023-01-01

## 2023-07-07 PROBLEM — I50.9 NEW ONSET OF CONGESTIVE HEART FAILURE (H): Status: ACTIVE | Noted: 2023-01-01

## 2023-07-07 PROBLEM — K72.00 SHOCK LIVER: Status: ACTIVE | Noted: 2023-01-01
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
## 2023-07-07 NOTE — ED TRIAGE NOTES
Patient arrived via EMS she live at [facility] staff at the [facility] have noticed gradual decline poor appetite generalized weakness . Per EMS she is usually able to able to ambulate independently now too weak needing a wheelchair to get around.       Triage Assessment     Row Name 07/07/23 9102       Triage Assessment (Adult)    Airway WDL WDL       Respiratory WDL    Respiratory WDL WDL       Skin Circulation/Temperature WDL    Skin Circulation/Temperature WDL WDL       Cardiac WDL    Cardiac WDL WDL       Peripheral/Neurovascular WDL    Peripheral Neurovascular WDL WDL       Cognitive/Neuro/Behavioral WDL    Cognitive/Neuro/Behavioral WDL WDL            Regan Barker RN  7/7/2023  5:31 PM

## 2023-07-08 NOTE — PHARMACY-ADMISSION MEDICATION HISTORY
Pharmacy Vancomycin Initial Note  Date of Service 2023  Patient's  1935  88 year old, female    Indication: Sepsis    Current estimated CrCl = CrCl cannot be calculated (Unknown ideal weight.).    Creatinine for last 3 days  2023:  5:43 PM Creatinine 2.96 mg/dL    Recent Vancomycin Level(s) for last 3 days  No results found for requested labs within last 3 days.      Vancomycin IV Administrations (past 72 hours)      No vancomycin orders with administrations in past 72 hours.                Nephrotoxins and other renal medications (From now, onward)    Start     Dose/Rate Route Frequency Ordered Stop    23  vancomycin (VANCOCIN) 1,000 mg in NaCl 0.9% 200 mL intermittent infusion         1,000 mg  over 60 Minutes Intravenous ONCE 23            Contrast Orders - past 72 hours (72h ago, onward)    None              Plan:  1. Start vancomycin  1000 mg IV load once.   2. Please re-consult pharmacy if vancomycin is to continue upon admission.    Monique Polanco, PharmD
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
## 2023-07-08 NOTE — PHARMACY-ADMISSION MEDICATION HISTORY
Pharmacist Admission Medication History    Admission medication history is complete. The information provided in this note is only as accurate as the sources available at the time of the update.    Medication reconciliation/reorder completed by provider prior to medication history? No    Information Source(s): Family member via phone, only allergy records from [facility], not med list.    Pertinent Information:    Unable to reach [facility], however, they do not handle her medications. Patient unable to provide history and SureScripts not viewable. Patient's son provided information - stopped atorvastatin, risk/benefit with age. Unknown compliance/last doses.     Changes made to PTA medication list:    Added: amlodipine 10 mg    Deleted: atorvastatin - stopped? Alendronate, multivitamin    Changed: glucosamine dose      Allergies reviewed with patient and updates made in EHR: yes, added triamterene per [facility] records.     Medication History Completed By: Ana Sanchez RPH 7/7/2023 8:54 PM    Prior to Admission medications    Medication Sig Last Dose Taking? Auth Provider Long Term End Date   amLODIPine (NORVASC) 10 MG tablet Take 10 mg by mouth daily Unknown Yes Unknown, Entered By History Yes    aspirin 81 MG EC tablet [ASPIRIN 81 MG EC TABLET] Take 81 mg by mouth daily. Unknown Yes Provider, Historical     b complex vitamins capsule [B COMPLEX VITAMINS CAPSULE] Take 1 capsule by mouth daily. Unknown Yes Provider, Historical     calcium-vitamin D (OSCAL) 250 (625)-125 mg-unit per tablet [CALCIUM-VITAMIN D (OSCAL) 250 (625)-125 MG-UNIT PER TABLET] Take 1 tablet by mouth daily. Unknown Yes Provider, Historical     Glucosamine HCl 1500 MG TABS Take 1,500 mg by mouth daily Unknown Yes Unknown, Entered By History     atorvastatin (LIPITOR) 40 MG tablet [ATORVASTATIN (LIPITOR) 40 MG TABLET] Take 40 mg by mouth bedtime.  Patient not taking: Reported on 7/7/2023 Not Taking  Provider, Historical

## 2023-07-08 NOTE — PLAN OF CARE
([id number]) Shift Events:      Pt arrived to unit and had episode of emesis, nausea subsided with no medications given. Pt. Had second episode of emesis at 0400, Zofran given (See MAR). Trending troponin (See Results). Lactic 2.4 Provider notified and fluids ordered.     Assessment:     Neuro: A/Ox4 forgetful    Respiratory: RA    Cardiovascular: Tele V paced in the 80s.    GI/: up to bedside commode to voide. Diet: Cardiac diet. No M this shift.    Lines/Drains: Bilateral AC PIVs    Gtts: NS @75ml/hr. Heparin gtt @850 units/hr    Pain: Denies      Problem: Fall Injury Risk  Goal: Absence of Fall and Fall-Related Injury  7/8/2023 0511 by Irma Landers RN  Outcome: Progressing  7/8/2023 0511 by Irma Landers RN  Outcome: Progressing  Intervention: Identify and Manage Contributors  Recent Flowsheet Documentation  Taken 7/8/2023 0400 by Irma Landers RN  Medication Review/Management: medications reviewed  Taken 7/8/2023 0000 by Irma Landers RN  Medication Review/Management: medications reviewed  Taken 7/7/2023 2230 by Irma Landers RN  Medication Review/Management: medications reviewed  Intervention: Promote Injury-Free Environment  Recent Flowsheet Documentation  Taken 7/8/2023 0400 by Irma Landers RN  Safety Promotion/Fall Prevention:    activity supervised    assistive device/personal items within reach    room near nurse's station    supervised activity  Taken 7/8/2023 0000 by Irma Landers RN  Safety Promotion/Fall Prevention:    activity supervised    assistive device/personal items within reach    room near nurse's station    supervised activity  Taken 7/7/2023 2230 by Irma Landers RN  Safety Promotion/Fall Prevention:    activity supervised    assistive device/personal items within reach    room near nurse's station    supervised activity     Problem: Infection  Goal: Absence of Infection Signs and Symptoms  Outcome: Progressing   Goal Outcome Evaluation:

## 2023-07-08 NOTE — CONSULTS
RENAL CONSULT NOTE    REQUESTING PHYSICIAN: MD Digna    REASON FOR CONSULT: STEPHANIE     PLAN:  Diuresis per cards  F/u on MGUS labs, some concern that she may have a myeloma?  (very high FLC ratio in 2021 and did not pursue further w/u at that time.  Uziel CK  antibx for UTI per [facility]  Daily I/o and wts   BMP in the a.m.     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   Modest proteinuria  H/o non-obstructing stones (renal US pending, though CT abd/pelvis with sig acute findings, non-obs strones ID'd again, renal cysts that do not need f/u )  CKD presumed etiol HTNive nephrosclerosis, though no prior bx; see below MGUS; pt notes that she would NOT want dialysis should it come to that.  Less likely sarcoid with normal Calcium, myeloma on the differential   CK was in the 2500 range, did fall, will uziel     MGUS: H/o very high kappa light chains in 2021, can see in renal dz, however FLC ratio in 2021 was near 13, uziel studies, immunofix and FLC ratio. She opted to NOT have further w/u in 2021 when MGUS identified (this seems reasonable), and states that she would not pursue tx or dialysis     Sepsis: on BS antibx, ? Source; elevated transaminases c/w probable shock liver,    Lytes/Acid-Base:   Hypercalcemia:  Pseudo, corrects normal 9.7 for low albumin.   Metabolic acidosis: oral bicarb initiated by hosp team    H/o Kidney stones:  Bladder not distended and no hydro on imaging   Noted on CT 2019 and again on CT this admit, non-obsructing, no hydro      Hypertension: BP well controlled curently    Elevated trops: ? Myocarditis, stress-induced cardiomyopathy, CHF, cards on board, less  convincing of ACS, trops are improving , diuresing per cards    H/o heart block:  Has PACER    Anemia:  hgb 8's, iron replete, may be d/t malnutrition, ? Occult malignancy     STM loss: reported by family, for years apparently, no sig AMS here, seems  At baseline per son's report     HPI: Anamaria Bass is a 88 year old female for whom we have been asked to see for STEPHANIE.  She presented wit hc/o gen weakness, poor appetite and ongoing wt loss noticed by her assisted living staff.  She is in independent living, does not have nursing support.  Takes her own meds etc .  Family here (son, DIL and GD) helping with intake.  Report that she has had declining health over the last few months.  More noticeable in the last week, fell at home.  Appetite has been poor, but not nauseated.  Having LBP for days ?r/t UTI, doesn't medicate.   Family reports that they were aware of MGUS dx in 2021, and that pt elected to not pursue w/u at that time.  She did see [facility]'s Dr Pozo once in 2021, but never f/u'd.  She is urinating without difficulty at home.  Prior renal stones on imaging that were non-obstructive.  Imaging here unremarkable (renal US is pending, but CT done on admit and remarkable only for small stones and cysts).  No hydro.    Pt seems to be at peace with MMP and states that she would not want aggressive cares, namely dialysis or chemo if warranted.  Family in agreement.      REVIEW OF SYSTEMS:  See HPI       Past Medical History:   Diagnosis Date     Claustrophobia      Hyperlipemia      Osteoporosis      Third degree AV block (H)     pacemaker               Social History     Tobacco Use     Smoking status: Never     Smokeless tobacco: Never          No current facility-administered medications on file prior to encounter.  amLODIPine (NORVASC) 10 MG tablet, Take 10 mg by mouth daily  aspirin 81 MG EC tablet, [ASPIRIN 81 MG EC TABLET] Take 81 mg by mouth daily.  b complex vitamins capsule, [B COMPLEX VITAMINS CAPSULE]  "Take 1 capsule by mouth daily.  calcium-vitamin D (OSCAL) 250 (625)-125 mg-unit per tablet, [CALCIUM-VITAMIN D (OSCAL) 250 (625)-125 MG-UNIT PER TABLET] Take 1 tablet by mouth daily.  Glucosamine HCl 1500 MG TABS, Take 1,500 mg by mouth daily  atorvastatin (LIPITOR) 40 MG tablet, [ATORVASTATIN (LIPITOR) 40 MG TABLET] Take 40 mg by mouth bedtime. (Patient not taking: Reported on 7/7/2023)          ALLERGIES/SENSITIVITIES:  Allergies   Allergen Reactions     Ether Nausea and Vomiting     Prednisone Other (See Comments)     Crabby, loopy, and weepy.     Triamterene      Hyperkalemia     Hydrochlorothiazide Hives and Rash     Lisinopril Rash     Penicillins Other (See Comments) and Rash     Unknown as child          PHYSICAL EXAM:  /68 (BP Location: Left arm)   Pulse 85   Temp 97.7  F (36.5  C) (Oral)   Resp (!) 33   Ht 1.64 m (5' 4.57\")   Wt 44.6 kg (98 lb 6.4 oz)   SpO2 96%   BMI 16.59 kg/m      Patient Vitals for the past 72 hrs:   Weight   07/08/23 0358 44.6 kg (98 lb 6.4 oz)   07/07/23 1949 45.5 kg (100 lb 6 oz)   07/07/23 1928 52.6 kg (116 lb)     Body mass index is 16.59 kg/m .    Intake/Output Summary (Last 24 hours) at 7/8/2023 1144  Last data filed at 7/8/2023 0800  Gross per 24 hour   Intake 553.98 ml   Output 200 ml   Net 353.98 ml         General - A & O x 2, NAD,family x 3 present, very cachectic appearance   HEENT - PERRLA, no scleral icterus  Neck - no carotid bruits, no JVD  Respiratory - Lungs CTA bilat without wheeze, rhonchi, rales, on RA  Cardiovascular - rate and BP well controlled   Abdomen - soft, BS present, no bruits, no fluid wave  Extremities - well perfused, no edema, sarcopenic   Integumentary - intact, good turgor, no rash/lesions  Neurologic - grossly intact  Psych:  Judgement intact, affect WNL  : some UO, not all documented.     Laboratory:  Recent Labs   Lab Test 07/08/23  0629 07/07/23  1945 07/07/23  1743   WBC 7.9 8.0 9.8   HGB 8.8* 9.5* 9.7*   * 106* 105* "   * 140* 166   INR 1.50*  --  1.40*      Recent Labs   Lab Test 07/08/23  0629 07/07/23  1743   POTASSIUM 4.6 5.1*   CHLORIDE 106 102   * 100*      Recent Labs   Lab Test 07/08/23  0629 07/08/23  0142 07/07/23  1743   ALBUMIN 2.8*  --  3.2*   BILITOTAL 0.6  --  0.5   ALT 1,970*  --  1,719*   AST 1,774*  --  1,634*   PROTEIN  --  20*  --        Personally reviewed today's laboratory studies      Thank you for involving us in the care of this patient. We will continue to follow along with you.      MARIBEL Enciso-BC  [facility]  [phone]

## 2023-07-08 NOTE — CONSULTS
Care Management Initial Consult    General Information  Assessment completed with: Zulay Sousa  Type of CM/SW Visit: Initial Assessment    Primary Care Provider verified and updated as needed: Yes   Readmission within the last 30 days: no previous admission in last 30 days         Advance Care Planning:            Communication Assessment  Patient's communication style: spoken language (English or Bilingual)    Hearing Difficulty or Deaf: no   Wear Glasses or Blind: yes    Cognitive  Cognitive/Neuro/Behavioral: mood/behavior, .WDL except           Mood/Behavior: anxious          Living Environment:   People in home: alone     Current living Arrangements:        Able to return to prior arrangements:         Family/Social Support:  Care provided by:  (facility)  Provides care for: no one, unable/limited ability to care for self     Children          Description of Support System:           Current Resources:   Patient receiving home care services:       Community Resources:    Equipment currently used at home: none  Supplies currently used at home:      Employment/Financial:  Employment Status:          Financial Concerns:             Does the patient's insurance plan have a 3 day qualifying hospital stay waiver?  No    Lifestyle & Psychosocial Needs:  Social Determinants of Health     Tobacco Use: Low Risk  (7/8/2023)    Patient History      Smoking Tobacco Use: Never      Smokeless Tobacco Use: Never      Passive Exposure: Not on file   Alcohol Use: Not on file   Financial Resource Strain: Not on file   Food Insecurity: Not on file   Transportation Needs: Not on file   Physical Activity: Not on file   Stress: Not on file   Social Connections: Not on file   Intimate Partner Violence: Not on file   Depression: Not on file   Housing Stability: Not on file       Functional Status:  Prior to admission patient needed assistance:              Mental Health Status:          Chemical Dependency Status:             "    Values/Beliefs:  Spiritual, Cultural Beliefs, Catholic Practices, Values that affect care: no  Description of Beliefs that Will Affect Care: Simon       Values/Beliefs Comment: Galdino    Additional Information:  Unable to reach ryann Mercer via phone call ([phone]); left voicemail.    Assessed via daughter Zulay, who states she's the eldest of 3 [place] but [place]  Son Ruslan is primary contact per Zulay; \"he's her POA and arranges everything at the [facility].\"  Son Alirio also lives locally.    Pt from [facility].  Pt independent with ambulation at baseline.    [facility] - [phone] Nurse Line - Left voicemail asking for pt's baseline, services pt was receiving.  Awaiting return call.    Unknown discharge needs at this time.    CM will continue to follow.    Jon Kilgore RN      "
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
## 2023-07-08 NOTE — CONSULTS
GI CONSULT NOTE      Name: Anamaria Bass  Medical Record #: [id number]  YOB: 1935  Date of Admission: 7/7/2023  Date/Time: 7/8/2023/8:19 AM     CHIEF COMPLAINT:  Elevated LFTs.    HISTORY OF PRESENT ILLNESS: We were asked to see Anamaria Bass by Penny Sawyer MD for elevated LFTs.    Anamaria Bass is a 88 year old year old female with history of CKD stage III, heart block status post pacemaker, osteoporosis, hypertension, hyperlipidemia who presented to the ED via EMS after staff at her [facility] noticed decline over the past week.  This facility staff noticed concerns of recent falls, weakness, change in cognition and reduced activity level.    Work-up in the ED notable for numerous lab abnormalities including creatinine 2.96, BNP greater than 2500, troponin 19, TSH 17.  Additionally was found to have elevated LFTs with ALT 1719, AST 1634 increased to 1970 and 1774 respectively this morning.  Alk phos 153, bilirubin 0.6.  INR 1.4.  Acetaminophen level negative.  CK elevated at 241.    CT scan of the abdomen and pelvis without contrast revealed a small amount of perihepatic ascites with a 3.3 x 0.8 cm hyperdensity posterior to the right hepatic lobe, nonspecific but could reflect a small hematoma.  Noted thickening of the right renal collecting system and proximal ureter, diverticulosis, and possible endometrial thickening up to 2 cm.    Bedside echo reportedly showed depressed EF.  Formal echocardiogram and cardiology consultation pending.    Patient denies abdominal pain, nausea, vomiting, change to appetite. Her only concern currently is right flank pain. There is no history of liver disease or relevant family history.    REVIEW OF SYSTEMS (ROS): Complete review of systems negative other than listed in HPI.    PAST MEDICAL HISTORY:  Past Medical History:   Diagnosis Date     Claustrophobia      Hyperlipemia      Osteoporosis      Third degree AV block (H)     pacemaker        FAMILY HISTORY:  No  "family history on file.    SOCIAL HISTORY:  Social History     Socioeconomic History     Marital status:      Spouse name: Not on file     Number of children: Not on file     Years of education: Not on file     Highest education level: Not on file   Occupational History     Not on file   Tobacco Use     Smoking status: Never     Smokeless tobacco: Never   Substance and Sexual Activity     Alcohol use: Not on file     Drug use: Not on file     Sexual activity: Not on file   Other Topics Concern     Not on file   Social History Narrative     Not on file     Social Determinants of Health     Financial Resource Strain: Not on file   Food Insecurity: Not on file   Transportation Needs: Not on file   Physical Activity: Not on file   Stress: Not on file   Social Connections: Not on file   Intimate Partner Violence: Not on file   Housing Stability: Not on file       MEDICATIONS PRIOR TO ADMISSION:   Medications Prior to Admission   Medication Sig Dispense Refill Last Dose     amLODIPine (NORVASC) 10 MG tablet Take 10 mg by mouth daily   Unknown     aspirin 81 MG EC tablet [ASPIRIN 81 MG EC TABLET] Take 81 mg by mouth daily.   Unknown     b complex vitamins capsule [B COMPLEX VITAMINS CAPSULE] Take 1 capsule by mouth daily.   Unknown     calcium-vitamin D (OSCAL) 250 (625)-125 mg-unit per tablet [CALCIUM-VITAMIN D (OSCAL) 250 (625)-125 MG-UNIT PER TABLET] Take 1 tablet by mouth daily.   Unknown     Glucosamine HCl 1500 MG TABS Take 1,500 mg by mouth daily   Unknown     atorvastatin (LIPITOR) 40 MG tablet [ATORVASTATIN (LIPITOR) 40 MG TABLET] Take 40 mg by mouth bedtime. (Patient not taking: Reported on 7/7/2023)   Not Taking          ALLERGIES: Ether, Prednisone, Triamterene, Hydrochlorothiazide, Lisinopril, and Penicillins    PHYSICAL EXAM:    /70 (BP Location: Left arm)   Pulse 83   Temp 97.5  F (36.4  C) (Oral)   Resp 17   Ht 1.64 m (5' 4.57\")   Wt 44.6 kg (98 lb 6.4 oz)   SpO2 97%   BMI 16.59 kg/m  "     GENERAL: Pleasant, no obvious distress  NECK: Supple without adenopathy  EYES: No scleral icterus  LUNGS: Crackles at bases bilaterally.  HEART: Regular rate and rhythm, S1 and S2 present, no lower extremity edema  ABDOMEN: Non-distended. Positive bowel sounds. Soft, non-tender, no guarding/rebound/mass, no obvious organomegaly  MUSKULOSKELETAL:  Warm and well perfused, moves all extremities well  SKIN: No jaundice  NEUROLOGIC: Alert and oriented  PSYCHIATRIC: Normal affect    LAB DATA:  CMP Results:   Recent Labs   Lab Test 07/08/23  0629 07/07/23 1743 07/07/23 1739 11/03/22  1033    135*  --  139   POTASSIUM 4.6 5.1*  --  4.0   CHLORIDE 106 102  --  104   CO2 17* 20*  --  28   ANIONGAP 14 13  --  7   GLC 98 108 122* 81   * 100*  --  31.6*   CR 2.64* 2.96*  --  1.49*   BILITOTAL 0.6 0.5  --   --    ALKPHOS 153* 177*  --   --    ALT 1,970* 1,719*  --   --    AST 1,774* 1,634*  --   --       CBC  Recent Labs   Lab 07/08/23  0629 07/07/23 1945 07/07/23 1743   WBC 7.9 8.0 9.8   RBC 2.59* 2.69* 2.84*   HGB 8.8* 9.5* 9.7*   HCT 26.9* 28.5* 29.8*   * 106* 105*   MCH 34.0* 35.3* 34.2*   MCHC 32.7 33.3 32.6   RDW 14.4 14.2 14.2   * 140* 166     INR  Recent Labs   Lab 07/08/23  0629 07/07/23  1743   INR 1.50* 1.40*      Lipase   Date Value Ref Range Status   07/07/2023 42 0 - 52 U/L Final       MELD-Na: 20 at 7/8/2023  6:29 AM  MELD: 20 at 7/8/2023  6:29 AM  Calculated from:  Serum Creatinine: 2.64 mg/dL at 7/8/2023  6:29 AM  Serum Sodium: 137 mmol/L at 7/8/2023  6:29 AM  Total Bilirubin: 0.6 mg/dL (Using min of 1 mg/dL) at 7/8/2023  6:29 AM  INR(ratio): 1.50 at 7/8/2023  6:29 AM        IMAGING/ENDOSCOPIC EVALUATION:    CT abdomen/pelvis without contrast 7/7/2023  IMPRESSION:   1.  Small amount of perihepatic ascites with a 3.3 x 0.8 cm hyperdensity posterior to the right hepatic lobe which is nonspecific but could reflect a small hematoma of uncertain etiology.  2.  Thickening of the right  renal collecting system and proximal ureter. Correlation for pyelonephritis.  3.  Diverticulosis without diverticulitis.  4.  Possible endometrial thickening measuring up to 2 cm. Ultrasound is recommended.    ASSESSMENT:  1.  Elevated LFTs.   2.  Acute CHF exacerbation.   3.  Pyelonephritis, possible sepsis.     This patient is an 88 year old female with CKD stage III, heart block status post pacemaker, osteoporosis, hypertension, hyperlipidemia who presented to the ER for evaluation of weakness, lethargy, cognitive decline after staff at her assisted living facility became concerned.  Work-up in the ED noted elevated BNP greater than 2500 and troponins.  She was found to have LFT elevation consistent with hepatocellular injury with ALT 1970, AST 1774, alk phos 153.  Bilirubin normal.  CT scan abdomen/pelvis without contrast noted a small amount of perihepatic ascites with a 3.3 x 0.8 cm hyperdensity posterior to the right hepatic lobe which may reflect a small hematoma. It is most likely that her transaminitis is related to her acute cardiac decompensation with congestive hepatopathy/ischemic hepatitis. Viral hepatitis pending. Will also obtain doppler ultrasound to rule out portal vein thrombosis. Please note that with ischemic hepatitis we expect to see transaminases down-trend as the bilirubin continues to rise over 2-3 days before bilirubin too begins to decline.     PLAN:  1.  Viral hepatitis panel pending.   2.  Doppler ultrasound.   3.  Monitor LFTs daily.   4.  Diuresis and antibiotics per cardiology and medicine teams.    Discussed with Dr. Orozco, GI staff physician.     TIME SPENT: 60 min including chart review, patient interview and care coordination.                                                Carolyn Bolton DNP  Thank you for the opportunity to participate in the care of this patient.   Please feel free to call me with any questions or concerns.  Phone number [phone].

## 2023-07-08 NOTE — ED NOTES
Pt resting more comfortably with repositioning and morphine administration. Tolerating infusions well; son remains at bedside. Report called to RADHA Solis for #[phone]. Dr Martinez hospitalist is in the room right now, will send pt upstairs after consultation.

## 2023-07-08 NOTE — PROGRESS NOTES
[facility] MEDICINE PROGRESS NOTE      Identification/Summary: Anamaria Bass is a 88 year old female with a past medical history of block, pacemaker in place, essential hypertension, CKD 3, MGUS, osteoporosis, cognitive impairment, lives at assisted living facility, came with generalized weakness, recurrent fall, found to have UTI and sepsis.  Started on IV cefepime.  Lactic acidosis corrected 2.4-->1.9.  Found to have elevated troponin 19.45-->17.90-->14.98.  Echocardiogram revealed global hypokinesia ( official reading is pending).  Started on IV heparin drip X 24 hours, metoprolol and aspirin. Statin is on hold due to elevated LFTs.  Evaluated by cardiology.  Recommended coronary CT angiogram versus nuclear stress test when medically stable. She also has volume overload.  Started on IV Lasix 40 mg twice a day.  Creatinine 2.96--> 2.64.  Baseline creatinine around 1.5.  Also has elevated LFTs with AST 1774, ALT 1970.  Patient has multiple health issues at present.  Cardiology, nephrology, gastroenterology consultations are requested.  Hemodynamically stable at present.  Oxygen saturation 96% in room air.    Assessment and Plan:  Non-STEMI  Echocardiogram revealed  global hypokinesia (final reading is pending)  Evaluated by cardiology  Started on IV heparin drip x 24 hours,: Aspirin  Nuclear stress test versus coronary CT angiogram on 7/10    Acute systolic CHF exacerbation  IV Lasix 40 mg twice a day    Acute kidney injury on CKD 3  Baseline creatinine around 5  Creatinine 2.96--> 2.64  Continue diuresis  Nephrology consultation    Metabolic acidosis, bicarb twice a day for few doses    Hyperkalemia 5.1-->4.6  Continue diuresis    Elevated LFTs  Could be from congestion  AST 1774, ALT 1970  Acute hepatitis panel still pending  Gastroenterology consultation    UTI  Sepsis  IV cefepime 1 g every 24 hours  Urine and blood cultures are pending    Severe malnutrition Body mass index is  "16.59 kg/m .   Cachexia: Estimated body mass index is 16.59 kg/m  as calculated from the following:    Height as of this encounter: 1.64 m (5' 4.57\").    Weight as of this encounter: 44.6 kg (98 lb 6.4 oz).   Po albuminemia, albumin 2.8  Increase nutrition supplement    Dyslipidemia, statin is on hold due to elevated LFTs    Essential hypertension  Norvasc 10 mg daily, on hold  Metoprolol 12.5 mg twice a day    Hypercalcemia: corrected calcium is >10.1, will monitor as appropriate      Coagulation Defect: INR = 1.50 (Ref range: 0.85 - 1.15) and/or PTT = N/A, will monitor for bleeding  Drug Induced Platelet Defect: home medication list includes an antiplatelet medication      Cognitive impairment, lives at assisted living facility    Possible endometrial thickening measuring up to 2 cm. Ultrasound is recommended.    Code full  DVT prophylaxis  On IV heparin drip  Barrier to discharge  Awaiting for more clinical improvement.  Anticipated discharge in few days      Penny Sawyer MD  [facility]  Phone: #[phone]  Securely message with the Vocera Web Console (learn more here)  Text page via [employer] Paging/Directory     Interval History/Subjective:  Resting comfortably.  Is a poor historian due to significant cognitive impairment.  Denies any pain.  Came with back pain.  Denies headache, chest pain, breathing difficulty, palpitation, nausea, vomiting or abdominal pain.    Physical Exam/Objective:  Temp:  [97.3  F (36.3  C)-97.7  F (36.5  C)] 97.7  F (36.5  C)  Pulse:  [83-91] 85  Resp:  [16-35] 33  BP: (102-125)/(58-78) 112/68  SpO2:  [96 %-100 %] 96 %  Body mass index is 16.59 kg/m .    GENERAL:  Alert, appears comfortable, in no acute distress, appears stated age, elderly frail lady   HEAD:  Normocephalic, without obvious abnormality, atraumatic   EYES:  PERRL, conjunctiva  clear,   EOM's intact   NOSE: Nares normal,  mucosa normal, no drainage   THROAT: Lips, " mucosa,  gums normal, mouth moist   NECK: Supple, symmetrical, trachea midline   BACK:   Symmetric, no curvature, ROM normal   LUNGS:    Decreased bibasilar breath sounds, no rales, rhonchi, or wheezing, symmetric chest rise on inhalation, respirations unlabored   CHEST WALL:  No tenderness or deformity   HEART:  Regular rate and rhythm, S1 and S2 normal, no murmur    ABDOMEN:   Soft, non-tender, bowel sounds active, no masses, no organomegaly, no rebound or guarding   EXTREMITIES: No edema    SKIN: No rashes   NEURO: Alert, oriented x3, moves all four extremities freely   PSYCH: Cooperative, behavior is appropriate      Data reviewed today: I personally reviewed all new medications, labs, imaging/diagnostics reports over the past 24 hours. Pertinent findings include:    Imaging:   Abdomen ct  1.  Small amount of perihepatic ascites with a 3.3 x 0.8 cm hyperdensity posterior to the right hepatic lobe which is nonspecific but could reflect a small hematoma of uncertain etiology.  2.  Thickening of the right renal collecting system and proximal ureter. Correlation for pyelonephritis.  3.  Diverticulosis without diverticulitis.  4.  Possible endometrial thickening measuring up to 2 cm. Ultrasound is recommended.    Leg US  1.  No deep venous thrombosis in the bilateral lower extremities.  2.  Small left popliteal fossa cyst.    HEAD CT  1.  No convincing findings of acute transcortical infarct. Underlying chronic microvascular ischemic changes can limit this assessment by CT. If sufficient clinical concern warrants further imaging and there is no contraindication, consider MRI.  2.  No acute intracranial hemorrhage or mass effect.   3.  Diffuse cerebral volume loss.    Labs:  Most Recent 3 CBC's:Recent Labs   Lab Test 07/08/23  0629 07/07/23  1945 07/07/23  1743   WBC 7.9 8.0 9.8   HGB 8.8* 9.5* 9.7*   * 106* 105*   * 140* 166     Most Recent 3 BMP's:Recent Labs   Lab Test 07/08/23  0629 07/07/23  1743  07/07/23  1739 11/03/22  1033    135*  --  139   POTASSIUM 4.6 5.1*  --  4.0   CHLORIDE 106 102  --  104   CO2 17* 20*  --  28   * 100*  --  31.6*   CR 2.64* 2.96*  --  1.49*   ANIONGAP 14 13  --  7   HARMAN 8.8 9.8  --  9.6   GLC 98 108 122* 81     Most Recent 2 LFT's:Recent Labs   Lab Test 07/08/23  0629 07/07/23  1743   AST 1,774* 1,634*   ALT 1,970* 1,719*   ALKPHOS 153* 177*   BILITOTAL 0.6 0.5     Most Recent 3 Troponin's: 19.45-->17.90-->14.98.    BNP 2552    Medications:   Personally Reviewed.  Medications     heparin 1,000 Units/hr (07/08/23 1010)     - MEDICATION INSTRUCTIONS -         ceFEPIme  1 g Intravenous Q24H     furosemide  40 mg Intravenous Q12H     sodium chloride (PF)  3 mL Intracatheter Q8H     Total time spent 50 min by me on the date of service doing chart review, history, exam, documentation & further activities per the note.

## 2023-07-08 NOTE — PROCEDURES
"PICC Line Insertion Procedure Note  Pt. Name: Anamaria Bass  MRN:   [id number]       Procedure: Insertion of a  triple Lumen  5 fr  Bard SOLO (valved) Power PICC, Lot number [id number]    Indications: multiple infusions    Contraindications : Left pacer    Procedure Details   Patient identified with 2 identifiers and \"Time Out\" conducted.  .     Central line insertion bundle followed: hand hygeine performed prior to procedure, site cleansed with cholraprep, hat, mask, sterile gloves,sterile gown worn, patient draped with maximum barrier head to toe drape, sterile field maintained.    The vein was assessed and found to be compressible and of adequate size. 4 ml 1% Lidocaine administered sq to the insertion site. A 5 Fr PICC was inserted into the basilic vein of the right arm with ultrasound guidance. 1 attempt(s) required to access vein.   Catheter threaded without difficulty. Good blood return noted.    Modified Seldinger Technique used for insertion.    The 8 sharps that are included in the PICC insertion kit were accounted for and disposed of in the sharps container prior to breakdown of the sterile field.    Catheter secured with Statlock, biopatch and Tegaderm dressing applied.    Findings:  Total catheter length  41 cm, with 0 cm exposed. Mid upper arm circumference is 21 cm. Catheter was flushed with 30 cc NS. Patient  tolerated procedure well.    Tip placement verified by  3CG  . Tip placement in the SVC.    CLABSI prevention brochure left at bedside.    Patient's primary RN notified PICC is ready for use.    Comments:        SHILA Vernon, RN  [facility]  "

## 2023-07-09 NOTE — PROGRESS NOTES
Resting comfortably. Some c/o back pain, managed with PRN dilaudid x1. Paced on telemetry. Lung sounds diminished on room air with SpO2 = 98%. Pure wick utilized. IV Heparin stopped per cardiology. PICC placed today. Seen and discussed with Dr. Sawyer.

## 2023-07-09 NOTE — PROGRESS NOTES
Hospitalist follow up note:    Called to assess pt due to agonal breathing, weak pulses. At the time of my encounter she had no spontaneous respirations, no audible heart sounds, no palpable carotid pulses. Time of death 0345am. Son Ruslan was present.     Tony Martinez DO   Pager #: [phone]

## 2023-07-09 NOTE — PROGRESS NOTES
Hospitalist follow up note:    Called to see pt due to severe low back pain, pale, hypotension, moaning in pain. On exam she is very pale, appears dry clinically. Also has some abdominal pain. Lactic acid repeated was elevated. Other labs pending. Will hold lasix for now, try low volume fluid bolus, hold metoprolol due to hypotension. No pulmonary symptoms or chest pain.     Tony Martinez DO   Pager #: [phone]

## 2023-07-09 NOTE — PROGRESS NOTES
0200: Pt hypotensive, awake but lethargic. C/o dizziness.  Lactic 9.4.  Dr. Martinez notified.  Dr. Martinez states he will consult tele ICU. See orders.    0000: Pt BG 75.  Pt attempted to drink some juice, had small amount of emesis.  Pt was sitting up straight while drinking and emesis.   Dr. Martinez notified.  Keeping NPO for now. See new orders.     2115:Pt pain and n/v unrelieved from meds given.  Pt moaning, very uncomfortable.  BP trending down.  Dr. Martinez paged to see pt at bedside.  See orders, MD note.

## 2023-07-09 NOTE — PROGRESS NOTES
.Hospitalist follow up note:    Pt continues to decline overnight. Lactic acid rising, more lethargic, now requiring O2, hypotensive. Checking VBG, consulted with tele ICU, stopped fluids, changed abx to meropenem and vanco.     Tony Martinez DO   Pager #: [phone]

## 2023-07-09 NOTE — PHARMACY-VANCOMYCIN DOSING SERVICE
Pharmacy Vancomycin Initial Note  Date of Service 2023  Patient's  1935  88 year old, female    Indication: Sepsis    Current estimated CrCl = Estimated Creatinine Clearance: 9.8 mL/min (A) (based on SCr of 2.8 mg/dL (H)).    Creatinine for last 3 days  2023:  5:43 PM Creatinine 2.96 mg/dL  2023:  6:29 AM Creatinine 2.64 mg/dL;  9:13 PM Creatinine 2.80 mg/dL    Recent Vancomycin Level(s) for last 3 days  No results found for requested labs within last 3 days.      Vancomycin IV Administrations (past 72 hours)                   vancomycin (VANCOCIN) 1,000 mg in NaCl 0.9% 200 mL intermittent infusion (mg) 1,000 mg Given 23 213                Nephrotoxins and other renal medications (From now, onward)    Start     Dose/Rate Route Frequency Ordered Stop    23 0300  vancomycin (VANCOCIN) 750 mg in 0.9% NaCl 250 mL intermittent infusion         750 mg  over 60 Minutes Intravenous ONCE 23 0212      23 0211  vancomycin place abreu - receiving intermittent dosing         1 each Intravenous SEE ADMIN INSTRUCTIONS 23 0212      23 1800  [Held by provider]  furosemide (LASIX) injection 40 mg        (Held by provider since Sat 2023 at 2129 by Tony Martinez DO.Hold Reason: Other)    40 mg  over 1-3 Minutes Intravenous EVERY 12 HOURS 23 0729            Contrast Orders - past 72 hours (72h ago, onward)    Start     Dose/Rate Route Frequency Stop    23 0900  perflutren lipid microsphere (DEFINITY) injection SUSP 3 mL         3 mL Intravenous ONCE 23 0858           Plan:  1. Start vancomycin  750 mg IV x 1  2. Further dosing based on vanco levels  3. Vancomycin monitoring method: Trough (Method 1 = dosing nomogram)  4. Vancomycin therapeutic monitoring goal: 10-15 mg/L  5. Pharmacy will check vancomycin levels as appropriate in 1-3 Days.    6. Serum creatinine levels will be ordered daily for the first week of therapy and at least twice weekly for  subsequent weeks.      Blayne Jameson RPH

## 2023-07-10 LAB
HAV IGM SERPL QL IA: NONREACTIVE
HBV CORE IGM SERPL QL IA: NONREACTIVE
HBV SURFACE AG SERPL QL IA: NONREACTIVE
HBV SURFACE AG SERPL QL IA: NONREACTIVE
HCV AB SERPL QL IA: NONREACTIVE
HIV 1+2 AB+HIV1 P24 AG SERPL QL IA: NONREACTIVE
KAPPA LC FREE SER-MCNC: 32.45 MG/DL (ref 0.33–1.94)
KAPPA LC FREE/LAMBDA FREE SER NEPH: 30.61 {RATIO} (ref 0.26–1.65)
LAMBDA LC FREE SERPL-MCNC: 1.06 MG/DL (ref 0.57–2.63)
PROT ELPH PNL UR ELPH: NORMAL
PROT PATTERN SERPL IFE-IMP: NORMAL

## 2023-07-10 NOTE — DISCHARGE SUMMARY
[facility] MEDICINE  DISCHARGE SUMMARY     Primary Care Physician: Shane Gama  Admission Date: 2023   Discharge Provider: Penny Sawyer MD Date of death: 2023 on 3.45 AM               Condition at Discharge:      REASON FOR PRESENTATION(See Admission Note for Details)   Weakness      Immediate cause of death  Multisystem organ failure  Non-STEMI  Acute systolic CHF  Acute kidney injury  Acute metabolic acidosis/lactic acidosis  Elevated LFTs  UTI and sepsis      PRINCIPAL & ACTIVE DISCHARGE DIAGNOSES   Multisystem organ failure  Non-STEMI  Acute systolic CHF exacerbation  Acute kidney injury on CKD 3  Metabolic acidosis  Hyperkalemia  Elevated LFTs  UTI and sepsis  Severe malnutrition with BMI 16.59 kg/m   Dyslipidemia  Essential hypertension  Hypercalcemia  Coagulation defect  Drug-induced platelet defect  Cognitive impairment  Possible endometrial thickening      SUMMARY OF HOSPITAL COURSE:      Ms.Sharon EVELIA Bass is a 88 year old female with a past medical history of heart block, pacemaker in place, essential hypertension, CKD 3, MGUS, osteoporosis, cognitive impairment, lives at assisted living facility, came with generalized weakness, recurrent fall, found to have UTI and sepsis.  Started on IV cefepime.  Lactic acidosis corrected 2.4-->1.9-->9.4.  Found to have elevated troponin 19.45-->17.90-->14.98.  Echocardiogram revealed severe global hypokinesis of left ventricle, EF 30 to 35%, moderate to severe tricuspid regurgitation, moderate to severe mitral regurgitation. Status post IV heparin drip X 24 hours, metoprolol and aspirin.  Received few doses of diuresis for volume load.  Received IV antibiotic for UTI and possible sepsis. Creatinine 2.96-->3.22. Statin was on hold due to elevated LFTs.  AST 1774-->2032, ALT 1970-->2499.  Evaluated by cardiology, nephrology, gastroenterology.  Patient has advanced age with multiple comorbidities.  Developed  multisystem organ failure.  Her condition continues to decline.  Lactic acid was rising, became more lethargic and was requiring oxygen and became hypotensive.  She  on 2023 at 3.45 AM.  Son was present in the room          Consults   Cardiology  Nephrology  Gastroenterology      Penny Sawyer MD  [facility]    CC:Shane Gama

## 2023-07-11 LAB — HCV RNA SERPL NAA+PROBE-ACNC: NOT DETECTED IU/ML

## 2023-07-12 LAB
BACTERIA BLD CULT: NO GROWTH
BACTERIA BLD CULT: NO GROWTH